# Patient Record
Sex: FEMALE | Race: WHITE | HISPANIC OR LATINO | URBAN - METROPOLITAN AREA
[De-identification: names, ages, dates, MRNs, and addresses within clinical notes are randomized per-mention and may not be internally consistent; named-entity substitution may affect disease eponyms.]

---

## 2018-08-14 ENCOUNTER — EMERGENCY (EMERGENCY)
Facility: HOSPITAL | Age: 23
LOS: 1 days | Discharge: HOME | End: 2018-08-14
Attending: EMERGENCY MEDICINE | Admitting: EMERGENCY MEDICINE
Payer: COMMERCIAL

## 2018-08-14 VITALS
WEIGHT: 123.02 LBS | HEART RATE: 100 BPM | DIASTOLIC BLOOD PRESSURE: 82 MMHG | OXYGEN SATURATION: 100 % | SYSTOLIC BLOOD PRESSURE: 132 MMHG | TEMPERATURE: 99 F | RESPIRATION RATE: 18 BRPM

## 2018-08-14 VITALS
HEART RATE: 90 BPM | SYSTOLIC BLOOD PRESSURE: 120 MMHG | OXYGEN SATURATION: 100 % | RESPIRATION RATE: 18 BRPM | TEMPERATURE: 99 F | DIASTOLIC BLOOD PRESSURE: 77 MMHG

## 2018-08-14 LAB
ALBUMIN SERPL ELPH-MCNC: 4.8 G/DL — SIGNIFICANT CHANGE UP (ref 3.3–5)
ALP SERPL-CCNC: 53 U/L — SIGNIFICANT CHANGE UP (ref 40–120)
ALT FLD-CCNC: 7 U/L — LOW (ref 10–45)
ANION GAP SERPL CALC-SCNC: 14 MMOL/L — SIGNIFICANT CHANGE UP (ref 5–17)
APPEARANCE UR: ABNORMAL
AST SERPL-CCNC: 17 U/L — SIGNIFICANT CHANGE UP (ref 10–40)
BACTERIA # UR AUTO: PRESENT /HPF
BASOPHILS NFR BLD AUTO: 0.1 % — SIGNIFICANT CHANGE UP (ref 0–2)
BILIRUB SERPL-MCNC: 0.5 MG/DL — SIGNIFICANT CHANGE UP (ref 0.2–1.2)
BILIRUB UR-MCNC: NEGATIVE — SIGNIFICANT CHANGE UP
BUN SERPL-MCNC: 8 MG/DL — SIGNIFICANT CHANGE UP (ref 7–23)
CALCIUM SERPL-MCNC: 9.5 MG/DL — SIGNIFICANT CHANGE UP (ref 8.4–10.5)
CHLORIDE SERPL-SCNC: 98 MMOL/L — SIGNIFICANT CHANGE UP (ref 96–108)
CO2 SERPL-SCNC: 23 MMOL/L — SIGNIFICANT CHANGE UP (ref 22–31)
COLOR SPEC: ABNORMAL
CREAT SERPL-MCNC: 0.72 MG/DL — SIGNIFICANT CHANGE UP (ref 0.5–1.3)
DIFF PNL FLD: ABNORMAL
EOSINOPHIL NFR BLD AUTO: 0.7 % — SIGNIFICANT CHANGE UP (ref 0–6)
EPI CELLS # UR: SIGNIFICANT CHANGE UP /HPF (ref 0–5)
GLUCOSE SERPL-MCNC: 112 MG/DL — HIGH (ref 70–99)
GLUCOSE UR QL: NEGATIVE — SIGNIFICANT CHANGE UP
HCT VFR BLD CALC: 38.3 % — SIGNIFICANT CHANGE UP (ref 34.5–45)
HGB BLD-MCNC: 12.9 G/DL — SIGNIFICANT CHANGE UP (ref 11.5–15.5)
KETONES UR-MCNC: NEGATIVE — SIGNIFICANT CHANGE UP
LEUKOCYTE ESTERASE UR-ACNC: ABNORMAL
LYMPHOCYTES # BLD AUTO: 17.9 % — SIGNIFICANT CHANGE UP (ref 13–44)
MCHC RBC-ENTMCNC: 31.5 PG — SIGNIFICANT CHANGE UP (ref 27–34)
MCHC RBC-ENTMCNC: 33.7 G/DL — SIGNIFICANT CHANGE UP (ref 32–36)
MCV RBC AUTO: 93.4 FL — SIGNIFICANT CHANGE UP (ref 80–100)
MONOCYTES NFR BLD AUTO: 9.7 % — SIGNIFICANT CHANGE UP (ref 2–14)
NEUTROPHILS NFR BLD AUTO: 71.6 % — SIGNIFICANT CHANGE UP (ref 43–77)
NITRITE UR-MCNC: NEGATIVE — SIGNIFICANT CHANGE UP
PH UR: 8 — SIGNIFICANT CHANGE UP (ref 5–8)
PLATELET # BLD AUTO: 257 K/UL — SIGNIFICANT CHANGE UP (ref 150–400)
POTASSIUM SERPL-MCNC: 3.6 MMOL/L — SIGNIFICANT CHANGE UP (ref 3.5–5.3)
POTASSIUM SERPL-SCNC: 3.6 MMOL/L — SIGNIFICANT CHANGE UP (ref 3.5–5.3)
PROT SERPL-MCNC: 8.2 G/DL — SIGNIFICANT CHANGE UP (ref 6–8.3)
PROT UR-MCNC: 30 MG/DL
RBC # BLD: 4.1 M/UL — SIGNIFICANT CHANGE UP (ref 3.8–5.2)
RBC # FLD: 14 % — SIGNIFICANT CHANGE UP (ref 10.3–16.9)
RBC CASTS # UR COMP ASSIST: ABNORMAL /HPF
SODIUM SERPL-SCNC: 135 MMOL/L — SIGNIFICANT CHANGE UP (ref 135–145)
SP GR SPEC: 1.01 — SIGNIFICANT CHANGE UP (ref 1–1.03)
UROBILINOGEN FLD QL: 0.2 E.U./DL — SIGNIFICANT CHANGE UP
WBC # BLD: 8.2 K/UL — SIGNIFICANT CHANGE UP (ref 3.8–10.5)
WBC # FLD AUTO: 8.2 K/UL — SIGNIFICANT CHANGE UP (ref 3.8–10.5)
WBC UR QL: < 5 /HPF — SIGNIFICANT CHANGE UP

## 2018-08-14 PROCEDURE — 85025 COMPLETE CBC W/AUTO DIFF WBC: CPT

## 2018-08-14 PROCEDURE — 99284 EMERGENCY DEPT VISIT MOD MDM: CPT

## 2018-08-14 PROCEDURE — 99283 EMERGENCY DEPT VISIT LOW MDM: CPT

## 2018-08-14 PROCEDURE — 81001 URINALYSIS AUTO W/SCOPE: CPT

## 2018-08-14 PROCEDURE — 87184 SC STD DISK METHOD PER PLATE: CPT

## 2018-08-14 PROCEDURE — 80053 COMPREHEN METABOLIC PANEL: CPT

## 2018-08-14 PROCEDURE — 87086 URINE CULTURE/COLONY COUNT: CPT

## 2018-08-14 PROCEDURE — 36415 COLL VENOUS BLD VENIPUNCTURE: CPT

## 2018-08-14 NOTE — ED PROVIDER NOTE - MEDICAL DECISION MAKING DETAILS
21 yo F with no pmh  c/o painless intermittent hematuria x 2 years. No dysuria, back pain or abd pain. VSS. Appears well. Abd soft, nt, nd, no CVAT. 23 yo F with no pmh  c/o painless intermittent hematuria x 2 years. No dysuria, back pain or abd pain. VSS. Appears well. Abd soft, nt, nd, no CVAT. Will check kidney function, UA, refer to urology

## 2018-08-14 NOTE — ED PROVIDER NOTE - OBJECTIVE STATEMENT
21 yo F with no pmh  c/o intermittent hematuria x 2 years. Pt states she will have a few days of hematuria and then it will resolved. Denies dysuria, hematuria, frequency, abd pain, back pain, n/v. Pt states she has never got it checked out and her mom told her to come to get it evaluated.

## 2018-08-14 NOTE — ED PROVIDER NOTE - PHYSICAL EXAMINATION
CONSTITUTIONAL: Well-appearing; well-nourished; in no apparent distress.   HEAD: Normocephalic; atraumatic.   EYES: PERRL; EOM intact; conjunctiva and sclera clear  ENT: normal nose; no rhinorrhea; normal pharynx with no erythema or lesions.   NECK: Supple; non-tender; no LAD  CARDIOVASCULAR: Normal S1, S2; no murmurs, rubs, or gallops. Regular rate and rhythm.   RESPIRATORY: Breathing easily; breath sounds clear and equal bilaterally; no wheezes, rhonchi, or rales.  GI: Soft; non-distended; non-tender; no palpable organomegaly. No CVAT  MSK: FROM at all extremities, normal tone   EXT: No cyanosis or edema; N/V intact  SKIN: Normal for age and race; warm; dry; good turgor; no apparent lesions or rash.   NEURO: A & O x 3; face symmetric; grossly unremarkable.   PSYCHOLOGICAL: The patient’s mood and manner are appropriate.

## 2018-08-14 NOTE — ED ADULT NURSE NOTE - OBJECTIVE STATEMENT
pt received in [place] [facility] o x 3 pt c/o hematuria on and off for 2 years , denies nay urinary symptoms , no dysuria , no urinary frequency

## 2018-08-14 NOTE — ED ADULT NURSE NOTE - NSIMPLEMENTINTERV_GEN_ALL_ED
Implemented All Universal Safety Interventions:  [place] to call system. Call bell, personal items and telephone within reach. Instruct patient to call for assistance. Room bathroom lighting operational. Non-slip footwear when patient is off stretcher. Physically safe environment: no spills, clutter or unnecessary equipment. Stretcher in lowest position, wheels locked, appropriate side rails in place.

## 2018-08-14 NOTE — ED ADULT TRIAGE NOTE - OTHER COMPLAINTS
Hx of colitis. Denies pain, fevers, chills. No CVA tenderness. Patient reports bright red blood intermittent x2 years. Came today because she told her mom and mom was concerned.

## 2018-08-14 NOTE — ED PROVIDER NOTE - ATTENDING CONTRIBUTION TO CARE
21 yo F hx of colitis presenting with 2 years of intermittent painless hematuria.  No dysuria, frequency or burning or flank pain.  Not currently having menses.  Came today because family member encouraged to finally have it evaluated.  Nl exam, no CVAT, belly soft.  Labs and urine checked and hematuria confirmed.  Will need outpt urology fu.  Pt ok with plan.

## 2018-08-16 PROBLEM — Z00.00 ENCOUNTER FOR PREVENTIVE HEALTH EXAMINATION: Status: ACTIVE | Noted: 2018-08-16

## 2018-08-16 LAB
-  CEFTRIAXONE: SIGNIFICANT CHANGE UP
-  CLINDAMYCIN: SIGNIFICANT CHANGE UP
-  ERYTHROMYCIN: SIGNIFICANT CHANGE UP
-  PENICILLIN: SIGNIFICANT CHANGE UP
-  VANCOMYCIN: SIGNIFICANT CHANGE UP
CULTURE RESULTS: SIGNIFICANT CHANGE UP
METHOD TYPE: SIGNIFICANT CHANGE UP
METHOD TYPE: SIGNIFICANT CHANGE UP
ORGANISM # SPEC MICROSCOPIC CNT: SIGNIFICANT CHANGE UP
SPECIMEN SOURCE: SIGNIFICANT CHANGE UP

## 2018-08-16 RX ORDER — CEPHALEXIN 500 MG
1 CAPSULE ORAL
Qty: 14 | Refills: 0 | OUTPATIENT
Start: 2018-08-16 | End: 2018-08-22

## 2018-08-18 DIAGNOSIS — R31.9 HEMATURIA, UNSPECIFIED: ICD-10-CM

## 2018-08-22 ENCOUNTER — TRANSCRIPTION ENCOUNTER (OUTPATIENT)
Age: 23
End: 2018-08-22

## 2018-08-22 ENCOUNTER — APPOINTMENT (OUTPATIENT)
Dept: UROLOGY | Facility: CLINIC | Age: 23
End: 2018-08-22
Payer: COMMERCIAL

## 2018-08-22 VITALS — HEART RATE: 89 BPM | DIASTOLIC BLOOD PRESSURE: 77 MMHG | TEMPERATURE: 98.7 F | SYSTOLIC BLOOD PRESSURE: 129 MMHG

## 2018-08-22 LAB
APPEARANCE: ABNORMAL
BACTERIA: PRESENT /HPF
BILIRUBIN URINE: ABNORMAL
BLOOD URINE: ABNORMAL
COLOR: ABNORMAL
GLUCOSE QUALITATIVE U: NEGATIVE
KETONES URINE: 15 MG/DL
LEUKOCYTE ESTERASE URINE: ABNORMAL
MICROSCOPIC-UA: NORMAL
NITRITE URINE: POSITIVE
PH URINE: 7
PROTEIN URINE: 100 MG/DL
RED BLOOD CELLS URINE: ABNORMAL /HPF
SPECIFIC GRAVITY URINE: 1.02
SQUAMOUS EPITHELIAL CELLS: ABNORMAL /HPF
UROBILINOGEN URINE: 1 E.U./DL
WHITE BLOOD CELLS URINE: ABNORMAL /HPF

## 2018-08-22 PROCEDURE — 99204 OFFICE O/P NEW MOD 45 MIN: CPT

## 2018-08-23 ENCOUNTER — FORM ENCOUNTER (OUTPATIENT)
Age: 23
End: 2018-08-23

## 2018-08-24 ENCOUNTER — OUTPATIENT (OUTPATIENT)
Dept: OUTPATIENT SERVICES | Facility: HOSPITAL | Age: 23
LOS: 1 days | End: 2018-08-24
Payer: COMMERCIAL

## 2018-08-24 ENCOUNTER — APPOINTMENT (OUTPATIENT)
Dept: CT IMAGING | Facility: HOSPITAL | Age: 23
End: 2018-08-24
Payer: COMMERCIAL

## 2018-08-24 LAB
BACTERIA UR CULT: NORMAL
CORE LAB NON GYN CYTOLOGY TO LENOX HILL: NORMAL

## 2018-08-24 PROCEDURE — 74178 CT ABD&PLV WO CNTR FLWD CNTR: CPT | Mod: 26

## 2018-08-24 PROCEDURE — 74178 CT ABD&PLV WO CNTR FLWD CNTR: CPT

## 2018-08-29 ENCOUNTER — APPOINTMENT (OUTPATIENT)
Dept: UROLOGY | Facility: CLINIC | Age: 23
End: 2018-08-29
Payer: COMMERCIAL

## 2018-08-29 VITALS — HEART RATE: 82 BPM | SYSTOLIC BLOOD PRESSURE: 139 MMHG | TEMPERATURE: 99.5 F | DIASTOLIC BLOOD PRESSURE: 76 MMHG

## 2018-08-29 PROCEDURE — 99213 OFFICE O/P EST LOW 20 MIN: CPT

## 2018-08-31 LAB — BACTERIA UR CULT: NORMAL

## 2018-09-04 ENCOUNTER — APPOINTMENT (OUTPATIENT)
Dept: NEPHROLOGY | Facility: CLINIC | Age: 23
End: 2018-09-04
Payer: COMMERCIAL

## 2018-09-04 VITALS — HEART RATE: 90 BPM | RESPIRATION RATE: 14 BRPM | DIASTOLIC BLOOD PRESSURE: 60 MMHG | SYSTOLIC BLOOD PRESSURE: 110 MMHG

## 2018-09-04 PROCEDURE — 99406 BEHAV CHNG SMOKING 3-10 MIN: CPT

## 2018-09-04 PROCEDURE — 99244 OFF/OP CNSLTJ NEW/EST MOD 40: CPT

## 2018-09-04 RX ORDER — CEPHALEXIN 500 MG/1
500 CAPSULE ORAL
Qty: 14 | Refills: 0 | Status: DISCONTINUED | COMMUNITY
Start: 2018-08-16 | End: 2018-09-04

## 2018-09-26 ENCOUNTER — APPOINTMENT (OUTPATIENT)
Dept: UROLOGY | Facility: CLINIC | Age: 23
End: 2018-09-26
Payer: COMMERCIAL

## 2018-09-26 VITALS — HEART RATE: 73 BPM | DIASTOLIC BLOOD PRESSURE: 75 MMHG | SYSTOLIC BLOOD PRESSURE: 120 MMHG | TEMPERATURE: 98.6 F

## 2018-09-26 PROCEDURE — 52000 CYSTOURETHROSCOPY: CPT

## 2018-10-09 ENCOUNTER — APPOINTMENT (OUTPATIENT)
Dept: VASCULAR SURGERY | Facility: CLINIC | Age: 23
End: 2018-10-09
Payer: COMMERCIAL

## 2018-10-09 PROCEDURE — 99204 OFFICE O/P NEW MOD 45 MIN: CPT

## 2018-10-30 ENCOUNTER — TRANSCRIPTION ENCOUNTER (OUTPATIENT)
Age: 23
End: 2018-10-30

## 2018-12-10 ENCOUNTER — APPOINTMENT (OUTPATIENT)
Dept: UROLOGY | Facility: CLINIC | Age: 23
End: 2018-12-10
Payer: COMMERCIAL

## 2018-12-10 VITALS — SYSTOLIC BLOOD PRESSURE: 155 MMHG | DIASTOLIC BLOOD PRESSURE: 80 MMHG | TEMPERATURE: 98.9 F | HEART RATE: 105 BPM

## 2018-12-10 PROCEDURE — 99213 OFFICE O/P EST LOW 20 MIN: CPT

## 2018-12-13 LAB — BACTERIA UR CULT: ABNORMAL

## 2019-03-24 ENCOUNTER — TRANSCRIPTION ENCOUNTER (OUTPATIENT)
Age: 24
End: 2019-03-24

## 2019-04-10 ENCOUNTER — APPOINTMENT (OUTPATIENT)
Dept: NEPHROLOGY | Facility: CLINIC | Age: 24
End: 2019-04-10

## 2019-07-08 ENCOUNTER — APPOINTMENT (OUTPATIENT)
Dept: SURGERY | Facility: CLINIC | Age: 24
End: 2019-07-08
Payer: COMMERCIAL

## 2019-07-08 VITALS
OXYGEN SATURATION: 96 % | BODY MASS INDEX: 20.17 KG/M2 | HEIGHT: 66 IN | HEART RATE: 70 BPM | SYSTOLIC BLOOD PRESSURE: 109 MMHG | WEIGHT: 125.5 LBS | DIASTOLIC BLOOD PRESSURE: 71 MMHG | TEMPERATURE: 97.6 F

## 2019-07-08 DIAGNOSIS — Z87.440 PERSONAL HISTORY OF URINARY (TRACT) INFECTIONS: ICD-10-CM

## 2019-07-08 DIAGNOSIS — Z84.1 FAMILY HISTORY OF DISORDERS OF KIDNEY AND URETER: ICD-10-CM

## 2019-07-08 DIAGNOSIS — L02.411 CUTANEOUS ABSCESS OF RIGHT AXILLA: ICD-10-CM

## 2019-07-08 DIAGNOSIS — N30.01 ACUTE CYSTITIS WITH HEMATURIA: ICD-10-CM

## 2019-07-08 DIAGNOSIS — L02.412 CUTANEOUS ABSCESS OF RIGHT AXILLA: ICD-10-CM

## 2019-07-08 DIAGNOSIS — Z87.19 PERSONAL HISTORY OF OTHER DISEASES OF THE DIGESTIVE SYSTEM: ICD-10-CM

## 2019-07-08 DIAGNOSIS — F17.200 NICOTINE DEPENDENCE, UNSPECIFIED, UNCOMPLICATED: ICD-10-CM

## 2019-07-08 PROCEDURE — 99204 OFFICE O/P NEW MOD 45 MIN: CPT

## 2019-07-08 RX ORDER — SULFAMETHOXAZOLE AND TRIMETHOPRIM 800; 160 MG/1; MG/1
800-160 TABLET ORAL TWICE DAILY
Qty: 14 | Refills: 0 | Status: DISCONTINUED | COMMUNITY
Start: 2019-01-04 | End: 2019-07-08

## 2019-07-08 RX ORDER — PHENAZOPYRIDINE HYDROCHLORIDE 200 MG/1
200 TABLET ORAL 3 TIMES DAILY
Qty: 12 | Refills: 0 | Status: DISCONTINUED | COMMUNITY
Start: 2018-12-10 | End: 2019-07-08

## 2019-08-17 PROBLEM — F17.200 TOBACCO DEPENDENCY: Status: ACTIVE | Noted: 2018-09-10

## 2019-08-17 PROBLEM — N30.01 ACUTE CYSTITIS WITH HEMATURIA: Status: ACTIVE | Noted: 2018-08-22

## 2019-08-17 NOTE — REVIEW OF SYSTEMS
[Heart Rate Is Slow] : the heart rate was not slow [Heart Rate Is Fast] : fast heart rate [Chest Pain] : chest pain [Palpitations] : no palpitations [Leg Claudication] : no intermittent leg claudication [Lower Ext Edema] : no lower extremity edema [Shortness Of Breath] : shortness of breath [Wheezing] : no wheezing [Cough] : no cough [SOB on Exertion] : no shortness of breath during exertion [Orthopnea] : no orthopnea [PND] : no PND [Abdominal Pain] : no abdominal pain [Vomiting] : no vomiting [Constipation] : constipation [Diarrhea] : no diarrhea [Heartburn] : no heartburn [Melena] : no melena [Sleep Disturbances] : no sleep disturbances [Suicidal] : not suicidal [Anxiety] : anxiety [Depression] : no depression [Change In Personality] : no personality change [Emotional Problems] : no emotional problems [Negative] : Heme/Lymph

## 2019-08-17 NOTE — HISTORY OF PRESENT ILLNESS
[de-identified] : Ms. Dick presented for evaluation and management of bilateral axillary abscesses.  The abscesses first were identified in January 2019.  She complained of itching of the abscesses.  She uses warm compresses to help with the abscesses.

## 2019-08-17 NOTE — ASSESSMENT
[FreeTextEntry1] : Ms. Dick is a 23-year-old woman with likely bilateral hidradenitis suppurativa.  She was referred to plastic surgery for evaluation and management.  She will follow up with me as needed.

## 2019-08-17 NOTE — PHYSICAL EXAM
[Calm] : calm [de-identified] : NCAT, no scleral icterus [de-identified] : NAD, comfortable [de-identified] : Supple, no JVD or cervical lymphadenopathy [de-identified] : S1S2 normal, RRR [de-identified] : CTAB [de-identified] : +BS soft NT ND.  No hepatosplenomegaly. [de-identified] : No clubbing, cyanosis, or edema. [de-identified] : Warm, dry.  Bilateral axillae have tracts and sinuses without any fluctuance to suggest active abscess, likely hidradenitis suppurativa. [de-identified] : A&Ox3

## 2020-12-21 PROBLEM — Z87.440 HISTORY OF URINARY TRACT INFECTION: Status: RESOLVED | Noted: 2019-01-04 | Resolved: 2020-12-21

## 2023-03-08 ENCOUNTER — EMERGENCY (EMERGENCY)
Facility: HOSPITAL | Age: 28
LOS: 1 days | Discharge: HOME | End: 2023-03-08
Admitting: EMERGENCY MEDICINE
Payer: COMMERCIAL

## 2023-03-08 VITALS
TEMPERATURE: 98 F | RESPIRATION RATE: 18 BRPM | HEIGHT: 66 IN | OXYGEN SATURATION: 98 % | WEIGHT: 121.03 LBS | HEART RATE: 91 BPM | SYSTOLIC BLOOD PRESSURE: 109 MMHG | DIASTOLIC BLOOD PRESSURE: 61 MMHG

## 2023-03-08 PROCEDURE — 99284 EMERGENCY DEPT VISIT MOD MDM: CPT

## 2023-03-08 PROCEDURE — 99282 EMERGENCY DEPT VISIT SF MDM: CPT

## 2023-03-08 RX ORDER — CLINDAMYCIN PHOSPHATE GEL USP, 1% 10 MG/G
1 GEL TOPICAL
Qty: 1 | Refills: 0
Start: 2023-03-08 | End: 2023-04-06

## 2023-03-08 NOTE — ED PROVIDER NOTE - CLINICAL SUMMARY MEDICAL DECISION MAKING FREE TEXT BOX
27-year-old female with past medical history of hidradenitis complains of swelling to right underarm.  Patient states it comes and goes she has had it for about 6 months, this morning it was very swollen and painful but then opened up on its own and started to drain.  Patient's mother wanted her to come here so she can get follow-up with this hospital.  Patient has never seen a specialist and has been in and out of the emergency room.  States sometimes she gets them on her breast as well.  Denies fever, chills. scars to R breast, +induration to R axilla, no fluctuance, no erythema 27-year-old female with past medical history of hidradenitis complains of swelling to right underarm.  Patient states it comes and goes she has had it for about 6 months, this morning it was very swollen and painful but then opened up on its own and started to drain.  Patient's mother wanted her to come here so she can get follow-up with this hospital.  Patient has never seen a specialist and has been in and out of the emergency room.  States sometimes she gets them on her breast as well.  Denies fever, chills. scars to R breast, +induration to R axilla, no fluctuance, no erythema. spoke with care coordinator regarding derm appt, pt given info for f/u

## 2023-03-08 NOTE — ED PROVIDER NOTE - OBJECTIVE STATEMENT
27-year-old female with past medical history of hidradenitis complains of swelling to right underarm.  Patient states it comes and goes she has had it for about 6 months, this morning it was very swollen and painful but then opened up on its own and started to drain.  Patient's mother wanted her to come here so she can get follow-up with this hospital.  Patient has never seen a specialist and has been in and out of the emergency room.  States sometimes she gets them on her breast as well.  Denies fever, chills

## 2023-03-08 NOTE — ED PROVIDER NOTE - NSFOLLOWUPINSTRUCTIONS_ED_ALL_ED_FT
Hidradenitis Suppurativa    WHAT YOU NEED TO KNOW:    Hidradenitis suppurativa (HS) is a chronic (long-term) skin disease that causes your sweat glands or hair follicles to get clogged and inflamed. HS causes red bumps that look like pimples or small boils to develop on your skin. The cause of HS is unknown. It may run in families. Being overweight and smoking worsens signs and symptoms of HS.     DISCHARGE INSTRUCTIONS:    Contact your healthcare provider if:   •Your symptoms get worse, even with treatment.       •You have questions or concerns about your condition or care.      Medicines: You may need any of the following:   •Antibiotics are used to treat or prevent a bacterial infection. Antibiotics may be used long-term. Antibiotics may be given as a cream or pill.       •NSAIDs, such as ibuprofen, help decrease swelling, pain, and fever. This medicine is available with or without a doctor's order. NSAIDs can cause stomach bleeding or kidney problems in certain people. If you take blood thinner medicine, always ask your healthcare provider if NSAIDs are safe for you. Always read the medicine label and follow directions.      •Acetaminophen decreases pain and fever. It is available without a doctor's order. Ask how much to take and how often to take it. Follow directions. Acetaminophen can cause liver damage if not taken correctly.      •Other medicines may be used to treat HS. These may include hormones, acne medicines, steroids, biologic therapy, and medicines that slow your immune system.       •Take your medicine as directed. Contact your healthcare provider if you think your medicine is not helping or if you have side effects. Tell your provider if you are allergic to any medicine. Keep a list of the medicines, vitamins, and herbs you take. Include the amounts, and when and why you take them. Bring the list or the pill bottles to follow-up visits. Carry your medicine list with you in case of an emergency.      Manage HS symptoms and decrease flare-ups:   •Apply warm, moist compresses. This may help to decrease pain. Keep the compress on your skin for 10 minutes. Sitz baths may be recommended if your genital or anal area is affected by HS. To do a sitz bath, fill a bathtub with 4 to 6 inches of warm water. You may also use a sitz bath pan that fits inside a toilet bowl. Sit in the sitz bath for 15 minutes. Do this 3 times a day, and after each bowel movement. The warm water can help decrease pain and swelling.       •Wash your skin gently. Use cleansers recommended by your healthcare provider. Antibacterial soap may be helpful.      •Lose weight if you are overweight. Weight loss may help to improve signs and symptoms of HS.       •Do not smoke. Smoking can make it more difficult to treat HS and worsen symptoms. Ask your healthcare provider for information if you currently smoke and need help to quit. E-cigarettes or smokeless tobacco still contain nicotine. Talk to your healthcare provider before you use these products.       •Do not wear tight clothing. Tight clothing rubs against your skin and causes irritation that can worsen HS.      •Do not shave or use deodorant in areas of skin affected by HS. Ask your healthcare provider about safe deodorant or hair removal options.       •Keep your skin cool. Overheating and sweating can cause an HS flare-up.       •Ask your healthcare provider if you should make any changes to the foods you eat. Your healthcare provider may recommend that you avoid dairy foods. A dairy-free diet may help decrease your symptoms. Dairy foods include milk, cheese, yogurt, and ice cream.       •Tell your healthcare provider if HS is causing you to feel depressed. Your healthcare provider may recommend counseling to help you cope with HS.       Follow up with your doctor as directed: Write down your questions so you remember to ask them during your visits.

## 2023-03-08 NOTE — ED PROVIDER NOTE - PHYSICAL EXAMINATION
CONSTITUTIONAL: Well-appearing;  in no apparent distress.   HEAD: Normocephalic; atraumatic.   EYES: PERRL; EOM intact; conjunctiva and sclera clear  ENT: normal nose; no rhinorrhea; normal pharynx with no erythema or lesions.   MSK: FROM at all extremities, normal tone   EXT: No cyanosis or edema; N/V intact  SKIN: scars to R breast, +induration to R axilla, no fluctuance, no erythema

## 2023-03-08 NOTE — ED ADULT TRIAGE NOTE - CHIEF COMPLAINT QUOTE
Patient states has Hidradenitis suppurativa, c/o of abscess with pus and pain and swelling on right armpit, under the right breast, and right big toe.  No chills/fever.

## 2023-03-08 NOTE — ED PROVIDER NOTE - CARE PROVIDER_API CALL
Betzy Diego)  Dermatology  [address] [place] [address], [place] [address]  Phone: [phone]  Fax: [phone]  Follow Up Time:

## 2023-03-08 NOTE — ED PROVIDER NOTE - PATIENT PORTAL LINK FT
You can access the FollowMyHealth Patient Portal offered by [facility] by registering at the following website: [email or web address]. By joining [email or web address] FollowMyHealth portal, you will also be able to view your health information using other applications (apps) compatible with our system.

## 2023-03-08 NOTE — ED ADULT NURSE NOTE - OBJECTIVE STATEMENT
27y female w PMH of hidradenitis c/o swelling to R underarm. states she has had this for months and this morning drained it on her own. states she occasionally gets same abscess on breasts. denies f/c. not on any abx currently. No acute distress noted at this time. +redness and swelling to site.

## 2023-03-10 DIAGNOSIS — M79.89 OTHER SPECIFIED SOFT TISSUE DISORDERS: ICD-10-CM

## 2023-03-10 DIAGNOSIS — R23.4 CHANGES IN SKIN TEXTURE: ICD-10-CM

## 2023-03-10 DIAGNOSIS — L73.2 HIDRADENITIS SUPPURATIVA: ICD-10-CM

## 2023-04-28 ENCOUNTER — APPOINTMENT (OUTPATIENT)
Dept: DERMATOLOGY | Facility: CLINIC | Age: 28
End: 2023-04-28
Payer: COMMERCIAL

## 2023-04-28 PROCEDURE — 11900 INJECT SKIN LESIONS </W 7: CPT

## 2023-04-28 PROCEDURE — 99204 OFFICE O/P NEW MOD 45 MIN: CPT | Mod: 25

## 2023-05-01 ENCOUNTER — NON-APPOINTMENT (OUTPATIENT)
Age: 28
End: 2023-05-01

## 2023-05-01 NOTE — ASSESSMENT
[FreeTextEntry1] : # Hidradenitis suppurativa - chronic condition not at treatment goal\par - Discussed condition, etiology, and exacerbating factors including cigarette/tobacco use\par - Pt is actively working on smoking cessation with PMD\par - We discussed treatment options including topical antibiotics, PO doxycycline, laser hair reduction, and systemic agents such as Humira\par - Systemic therapy with Humira also reviewed including R/B/E; she declines for now\par - As there are three focal sinus tracts, feel that surgical excision is a good option; referred to plastic or general surgery\par - For symptom control, will perform ILK\par \par Intralesional injection of Kenalog, 5 mg/ml ([id number], exp 4/24)\par A total of 0.5 ml was injected over 3 injection sites\par \par The risks/benefits/alternatives of intralesional steroid injections were reviewed with the patient. Intralesional injections were performed in the affected area. The patient tolerated the procedure well.\par \par FU PRN for ILK

## 2023-05-01 NOTE — HISTORY OF PRESENT ILLNESS
[FreeTextEntry1] : JAMEY - NPA [de-identified] : # Hidradenitis suppurativa\par Ongoing x years\par Bilateral axilla and sometimes inframammary\par Has tender lesions on R > L axilla now\par Has used short courses of doxy in the past as well as topicals\par Currently smoking 1 pack every 4 days\par \par Here today with mom.

## 2023-05-01 NOTE — PHYSICAL EXAM
[Alert] : alert [FreeTextEntry3] : Focused exam performed. Findings notable for:\par \par Body tender sinus tract with drainage on R axilla x2 and L axilla [Oriented x 3] : ~L oriented x 3

## 2023-05-09 ENCOUNTER — APPOINTMENT (OUTPATIENT)
Dept: PLASTIC SURGERY | Facility: CLINIC | Age: 28
End: 2023-05-09
Payer: COMMERCIAL

## 2023-05-09 VITALS — WEIGHT: 120 LBS | BODY MASS INDEX: 19.29 KG/M2 | TEMPERATURE: 97.9 F | HEIGHT: 66 IN

## 2023-05-09 DIAGNOSIS — N60.02 SOLITARY CYST OF LEFT BREAST: ICD-10-CM

## 2023-05-09 PROCEDURE — 99203 OFFICE O/P NEW LOW 30 MIN: CPT

## 2023-05-09 NOTE — ASSESSMENT
[FreeTextEntry1] : Pt was seen and examined together by EVE Rosario and Dr. Volodymyr Hoffman. Assessment and plan formulated and discussed at time of visit.\par

## 2023-05-09 NOTE — HISTORY OF PRESENT ILLNESS
[FreeTextEntry1] : Problem - Hidradenitis suppurativa under bilateral axilla. Noted 6/7 years ago. \par Patient has been seen by Dermatology. Dr Nayla Chacon\par Previous treatments - Steroid injection 5/5/23 and Topicial 1% gel, and antibiotic soap\par No itching, bleeding, or drainage.\par No Imaging/Biopsy.\par Slowly growing, no change in color.\par No Infection or inflammation.\par Discomfort.\par No personal hx of skin cancer, masses.\par No family hx of skin cancer.\par \par

## 2023-05-09 NOTE — REASON FOR VISIT
Per Dr. Gama, the patient should not be taking 2 pills daily for her migraines and this is likely causing rebound. LMOVM to explain why we cannot PA for 30 days. Pt to call back for questions.    [Consultation] : a consultation visit [Family Member] : family member [FreeTextEntry1] : Dr Nayla Chacon

## 2023-05-13 ENCOUNTER — EMERGENCY (EMERGENCY)
Facility: HOSPITAL | Age: 28
LOS: 1 days | Discharge: LEFT BEFORE DISCHARGE | End: 2023-05-13
Admitting: EMERGENCY MEDICINE
Payer: COMMERCIAL

## 2023-05-13 VITALS
TEMPERATURE: 98 F | RESPIRATION RATE: 16 BRPM | DIASTOLIC BLOOD PRESSURE: 61 MMHG | SYSTOLIC BLOOD PRESSURE: 105 MMHG | OXYGEN SATURATION: 99 % | HEART RATE: 78 BPM

## 2023-05-13 VITALS
HEIGHT: 66 IN | SYSTOLIC BLOOD PRESSURE: 116 MMHG | WEIGHT: 121.25 LBS | OXYGEN SATURATION: 100 % | DIASTOLIC BLOOD PRESSURE: 71 MMHG | RESPIRATION RATE: 18 BRPM | HEART RATE: 98 BPM | TEMPERATURE: 98 F

## 2023-05-13 DIAGNOSIS — R82.71 BACTERIURIA: ICD-10-CM

## 2023-05-13 DIAGNOSIS — R31.9 HEMATURIA, UNSPECIFIED: ICD-10-CM

## 2023-05-13 DIAGNOSIS — R80.9 PROTEINURIA, UNSPECIFIED: ICD-10-CM

## 2023-05-13 DIAGNOSIS — N61.1 ABSCESS OF THE BREAST AND NIPPLE: ICD-10-CM

## 2023-05-13 DIAGNOSIS — Z86.79 PERSONAL HISTORY OF OTHER DISEASES OF THE CIRCULATORY SYSTEM: ICD-10-CM

## 2023-05-13 DIAGNOSIS — Z88.0 ALLERGY STATUS TO PENICILLIN: ICD-10-CM

## 2023-05-13 DIAGNOSIS — R82.4 ACETONURIA: ICD-10-CM

## 2023-05-13 DIAGNOSIS — Z53.29 PROCEDURE AND TREATMENT NOT CARRIED OUT BECAUSE OF PATIENT'S DECISION FOR OTHER REASONS: ICD-10-CM

## 2023-05-13 DIAGNOSIS — Z88.1 ALLERGY STATUS TO OTHER ANTIBIOTIC AGENTS STATUS: ICD-10-CM

## 2023-05-13 DIAGNOSIS — Z87.448 PERSONAL HISTORY OF OTHER DISEASES OF URINARY SYSTEM: ICD-10-CM

## 2023-05-13 DIAGNOSIS — Z86.018 PERSONAL HISTORY OF OTHER BENIGN NEOPLASM: ICD-10-CM

## 2023-05-13 DIAGNOSIS — R31.0 GROSS HEMATURIA: ICD-10-CM

## 2023-05-13 DIAGNOSIS — Z87.440 PERSONAL HISTORY OF URINARY (TRACT) INFECTIONS: ICD-10-CM

## 2023-05-13 DIAGNOSIS — R82.998 OTHER ABNORMAL FINDINGS IN URINE: ICD-10-CM

## 2023-05-13 DIAGNOSIS — L73.2 HIDRADENITIS SUPPURATIVA: ICD-10-CM

## 2023-05-13 DIAGNOSIS — Z86.19 PERSONAL HISTORY OF OTHER INFECTIOUS AND PARASITIC DISEASES: ICD-10-CM

## 2023-05-13 LAB
ANION GAP SERPL CALC-SCNC: 9 MMOL/L — SIGNIFICANT CHANGE UP (ref 5–17)
ANISOCYTOSIS BLD QL: SLIGHT — SIGNIFICANT CHANGE UP
APPEARANCE UR: ABNORMAL
APTT BLD: 27.2 SEC — LOW (ref 27.5–35.5)
BACTERIA # UR AUTO: PRESENT /HPF
BASOPHILS # BLD AUTO: 0 K/UL — SIGNIFICANT CHANGE UP (ref 0–0.2)
BASOPHILS NFR BLD AUTO: 0 % — SIGNIFICANT CHANGE UP (ref 0–2)
BILIRUB UR-MCNC: NEGATIVE — SIGNIFICANT CHANGE UP
BUN SERPL-MCNC: 8 MG/DL — SIGNIFICANT CHANGE UP (ref 7–23)
CALCIUM SERPL-MCNC: 9.1 MG/DL — SIGNIFICANT CHANGE UP (ref 8.4–10.5)
CHLORIDE SERPL-SCNC: 103 MMOL/L — SIGNIFICANT CHANGE UP (ref 96–108)
CO2 SERPL-SCNC: 24 MMOL/L — SIGNIFICANT CHANGE UP (ref 22–31)
COLOR SPEC: ABNORMAL
CREAT SERPL-MCNC: 0.53 MG/DL — SIGNIFICANT CHANGE UP (ref 0.5–1.3)
DIFF PNL FLD: ABNORMAL
EGFR: 128 ML/MIN/1.73M2 — SIGNIFICANT CHANGE UP
EOSINOPHIL # BLD AUTO: 0.14 K/UL — SIGNIFICANT CHANGE UP (ref 0–0.5)
EOSINOPHIL NFR BLD AUTO: 1.7 % — SIGNIFICANT CHANGE UP (ref 0–6)
EPI CELLS # UR: SIGNIFICANT CHANGE UP /HPF (ref 0–5)
GIANT PLATELETS BLD QL SMEAR: PRESENT — SIGNIFICANT CHANGE UP
GLUCOSE SERPL-MCNC: 95 MG/DL — SIGNIFICANT CHANGE UP (ref 70–99)
GLUCOSE UR QL: NEGATIVE — SIGNIFICANT CHANGE UP
HCG SERPL-ACNC: <0 MIU/ML — SIGNIFICANT CHANGE UP
HCT VFR BLD CALC: 27.6 % — LOW (ref 34.5–45)
HCT VFR BLD CALC: 29.1 % — LOW (ref 34.5–45)
HGB BLD-MCNC: 7.7 G/DL — LOW (ref 11.5–15.5)
HGB BLD-MCNC: 8.1 G/DL — LOW (ref 11.5–15.5)
HYPOCHROMIA BLD QL: SIGNIFICANT CHANGE UP
INR BLD: 1.14 — SIGNIFICANT CHANGE UP (ref 0.88–1.16)
KETONES UR-MCNC: ABNORMAL MG/DL
LEUKOCYTE ESTERASE UR-ACNC: ABNORMAL
LYMPHOCYTES # BLD AUTO: 1.79 K/UL — SIGNIFICANT CHANGE UP (ref 1–3.3)
LYMPHOCYTES # BLD AUTO: 21.8 % — SIGNIFICANT CHANGE UP (ref 13–44)
MANUAL SMEAR VERIFICATION: SIGNIFICANT CHANGE UP
MCHC RBC-ENTMCNC: 18.6 PG — LOW (ref 27–34)
MCHC RBC-ENTMCNC: 18.7 PG — LOW (ref 27–34)
MCHC RBC-ENTMCNC: 27.8 GM/DL — LOW (ref 32–36)
MCHC RBC-ENTMCNC: 27.9 GM/DL — LOW (ref 32–36)
MCV RBC AUTO: 66.9 FL — LOW (ref 80–100)
MCV RBC AUTO: 67.2 FL — LOW (ref 80–100)
MICROCYTES BLD QL: SLIGHT — SIGNIFICANT CHANGE UP
MONOCYTES # BLD AUTO: 0.72 K/UL — SIGNIFICANT CHANGE UP (ref 0–0.9)
MONOCYTES NFR BLD AUTO: 8.7 % — SIGNIFICANT CHANGE UP (ref 2–14)
NEUTROPHILS # BLD AUTO: 5.57 K/UL — SIGNIFICANT CHANGE UP (ref 1.8–7.4)
NEUTROPHILS NFR BLD AUTO: 67.8 % — SIGNIFICANT CHANGE UP (ref 43–77)
NITRITE UR-MCNC: POSITIVE
NRBC # BLD: 0 /100 WBCS — SIGNIFICANT CHANGE UP (ref 0–0)
OVALOCYTES BLD QL SMEAR: SLIGHT — SIGNIFICANT CHANGE UP
PH UR: 6.5 — SIGNIFICANT CHANGE UP (ref 5–8)
PLAT MORPH BLD: ABNORMAL
PLATELET # BLD AUTO: 355 K/UL — SIGNIFICANT CHANGE UP (ref 150–400)
PLATELET # BLD AUTO: 364 K/UL — SIGNIFICANT CHANGE UP (ref 150–400)
POIKILOCYTOSIS BLD QL AUTO: SLIGHT — SIGNIFICANT CHANGE UP
POLYCHROMASIA BLD QL SMEAR: SLIGHT — SIGNIFICANT CHANGE UP
POTASSIUM SERPL-MCNC: 3.8 MMOL/L — SIGNIFICANT CHANGE UP (ref 3.5–5.3)
POTASSIUM SERPL-SCNC: 3.8 MMOL/L — SIGNIFICANT CHANGE UP (ref 3.5–5.3)
PROT UR-MCNC: >=300 MG/DL
PROTHROM AB SERPL-ACNC: 13.6 SEC — HIGH (ref 10.5–13.4)
RBC # BLD: 4.11 M/UL — SIGNIFICANT CHANGE UP (ref 3.8–5.2)
RBC # BLD: 4.35 M/UL — SIGNIFICANT CHANGE UP (ref 3.8–5.2)
RBC # FLD: 20.9 % — HIGH (ref 10.3–14.5)
RBC # FLD: 21.1 % — HIGH (ref 10.3–14.5)
RBC BLD AUTO: ABNORMAL
RBC CASTS # UR COMP ASSIST: ABNORMAL /HPF
SODIUM SERPL-SCNC: 136 MMOL/L — SIGNIFICANT CHANGE UP (ref 135–145)
SP GR SPEC: 1.02 — SIGNIFICANT CHANGE UP (ref 1–1.03)
UROBILINOGEN FLD QL: 1 E.U./DL — SIGNIFICANT CHANGE UP
WBC # BLD: 8.22 K/UL — SIGNIFICANT CHANGE UP (ref 3.8–10.5)
WBC # BLD: 9.19 K/UL — SIGNIFICANT CHANGE UP (ref 3.8–10.5)
WBC # FLD AUTO: 8.22 K/UL — SIGNIFICANT CHANGE UP (ref 3.8–10.5)
WBC # FLD AUTO: 9.19 K/UL — SIGNIFICANT CHANGE UP (ref 3.8–10.5)
WBC UR QL: < 5 /HPF — SIGNIFICANT CHANGE UP

## 2023-05-13 PROCEDURE — 87075 CULTR BACTERIA EXCEPT BLOOD: CPT

## 2023-05-13 PROCEDURE — 87070 CULTURE OTHR SPECIMN AEROBIC: CPT

## 2023-05-13 PROCEDURE — 85027 COMPLETE CBC AUTOMATED: CPT

## 2023-05-13 PROCEDURE — 84702 CHORIONIC GONADOTROPIN TEST: CPT

## 2023-05-13 PROCEDURE — 96374 THER/PROPH/DIAG INJ IV PUSH: CPT

## 2023-05-13 PROCEDURE — 80048 BASIC METABOLIC PNL TOTAL CA: CPT

## 2023-05-13 PROCEDURE — 85730 THROMBOPLASTIN TIME PARTIAL: CPT

## 2023-05-13 PROCEDURE — 85025 COMPLETE CBC W/AUTO DIFF WBC: CPT

## 2023-05-13 PROCEDURE — 85610 PROTHROMBIN TIME: CPT

## 2023-05-13 PROCEDURE — 36415 COLL VENOUS BLD VENIPUNCTURE: CPT

## 2023-05-13 PROCEDURE — 96375 TX/PRO/DX INJ NEW DRUG ADDON: CPT

## 2023-05-13 PROCEDURE — 76642 ULTRASOUND BREAST LIMITED: CPT | Mod: 26,LT

## 2023-05-13 PROCEDURE — 99284 EMERGENCY DEPT VISIT MOD MDM: CPT | Mod: 25

## 2023-05-13 PROCEDURE — 99285 EMERGENCY DEPT VISIT HI MDM: CPT

## 2023-05-13 PROCEDURE — 81001 URINALYSIS AUTO W/SCOPE: CPT

## 2023-05-13 PROCEDURE — 76642 ULTRASOUND BREAST LIMITED: CPT

## 2023-05-13 PROCEDURE — 87040 BLOOD CULTURE FOR BACTERIA: CPT

## 2023-05-13 RX ORDER — CEPHALEXIN 500 MG
1 CAPSULE ORAL
Qty: 28 | Refills: 0
Start: 2023-05-13 | End: 2023-05-19

## 2023-05-13 RX ORDER — VANCOMYCIN HCL 1 G
1000 VIAL (EA) INTRAVENOUS ONCE
Refills: 0 | Status: COMPLETED | OUTPATIENT
Start: 2023-05-13 | End: 2023-05-13

## 2023-05-13 RX ORDER — CEFAZOLIN SODIUM 1 G
2000 VIAL (EA) INJECTION ONCE
Refills: 0 | Status: COMPLETED | OUTPATIENT
Start: 2023-05-13 | End: 2023-05-13

## 2023-05-13 RX ORDER — KETOROLAC TROMETHAMINE 30 MG/ML
15 SYRINGE (ML) INJECTION ONCE
Refills: 0 | Status: DISCONTINUED | OUTPATIENT
Start: 2023-05-13 | End: 2023-05-13

## 2023-05-13 RX ADMIN — Medication 250 MILLIGRAM(S): at 09:41

## 2023-05-13 RX ADMIN — Medication 100 MILLIGRAM(S): at 10:50

## 2023-05-13 RX ADMIN — Medication 15 MILLIGRAM(S): at 09:30

## 2023-05-13 NOTE — ED ADULT NURSE NOTE - OBJECTIVE STATEMENT
Pt reports swelling/redness/tenderness to left breast x 1 week along w oozing purulent drainage. Worst in the last 3 days. Pt reports rx steroid injection to right armpit. Pt also reports non painful "gross hematuria" for 1-2 years ago. denies fever. Hx of hidradenitis.

## 2023-05-13 NOTE — ED PROVIDER NOTE - PATIENT PORTAL LINK FT
You can access the FollowMyHealth Patient Portal offered by [facility] by registering at the following website: [email or web address]. By joining [employer]’s FollowMyHealth portal, you will also be able to view your health information using other applications (apps) compatible with our system.

## 2023-05-13 NOTE — CONSULT NOTE ADULT - SUBJECTIVE AND OBJECTIVE BOX
Surgery Consult    Consulting Surgical Team:   [ ] Team 1 - Colorectal/Surgical Oncology ([phone])    [ ] Team 2 - MIS/Bariatric Surgery ([phone])     [ ] Team 4 - Acute Care Surgery ([phone])     [ x ] Team 5 - General Surgery/Breast/Pediatric Surgery ([phone])    Consulting Attending: Dr. Sorensen    HPI:  28yo Female pt with PMH hidradenitis suppurativa, Nutcracker syndrome, breast cyst, cystitis and PSH of cystoscopy presents with worseing LEFT breast pain for several days and enlarging breast cyst for 2 weeks. Patient was evaluated by dermatologist Dr Winslow on  for worsening HS administered IL kenalog into RIGHT axilla. Following evaluation noticed enlargement of known left breast cyst. Patient had scheduled appointment with Plastic Surgery this week with Dr. Marcial for surgical evaluation of b/l axillary HS and was offered drainage of LEFT breast cyst at that time, however she declined. Since then had worseing left breast pain and enlargement of cyst. Pain is worse around nipple, sensitive to touch constant. Pain associated with small volume purulent drainage from new area around cyst, however she denies new nipple retraction, nipple discharge, fevers, chills, chest pain, dyspnea, abdominal pain, nausea or vomiting. Pain improved with administration of Abx in emergency department. Patient states she has had intermittent hematuria, since original presentation in 2018 when she was diagnosed with Nutcracker syndrome, and continues to have blood in urine, not worsened over last several weeks. Denies history of breast abscesses requiring intervention, no breast biopsies. No family or personal history of breast cancer.        PMH: hidradenitis suppurativa, Nutcracker syndrome, breast cyst, cystitis, viridans group Strep UTI, Ecoli meningitis perinatally  PSH: cystoscopy (Sep 2018)  Meds: clindamycin topical  All: Bactrim and keflex (increased breast cyst size)  Social Hx: Daily PPD, Daily MJ use, no other recreational drug use  Family Hx: Denies family hx of IBS, Crohns, UC, or colon cancer. mother with diverticulitis and nephrolithiasis, nephew with crohn's disease. pt's great aunts with ovarian and pancreatic ca. No autoimmune diseases, no h/o hematuria or renal disease in any family members. No tuberous sclerosis. No hearing/vision problems    Functional capacity: >4 METS  Last colonoscopy/EGD: none    In the ED:   - Afebrile, nontachycardic, normotensive, and satting well on RA   - Labs significant for: WBC 8.22 K/uL Hgb 8.1 g/dL BUN 8 mg/dL Cr 0.53 mg/dL  - Imaging:   FINDINGS:  There is a 5.3 x 2.8 x 5.8 cm complex fluid collection in the upper inner   quadrant of the left breast with peripheral vascularity and soft tissue   edema. A small tract is noted between the fluid collection and the skin   surface.  Corresponds with clinical finding of a draining wound.  IMPRESSION:  Draining left breast abscess.  Discussed with ER staff.      Physical Exam  General: AAOx3, NAD, laying comfortably in bed  Breast and Axilla: Exam chaperoned by RN Dominique: LEFT breast UIQ fluctuant   Cardio: RRR  Pulm: non labored breathing  Abdomen: Soft, non distended, no rebound or guarding  Extremities: WWP                          8.1    8.22  )-----------( 364      ( 13 May 2023 09:48 )             29.1         136  |  103  |  8   ----------------------------<  95  3.8   |  24  |  0.53    Ca    9.1      13 May 2023 09:48        PT/INR - ( 13 May 2023 09:48 )   PT: 13.6 sec;   INR: 1.14          PTT - ( 13 May 2023 09:48 )  PTT:27.2 sec            Urinalysis Basic - ( 13 May 2023 10:18 )    Color: Red / Appearance: Cloudy / S.020 / pH: x  Gluc: x / Ketone: Trace mg/dL  / Bili: Negative / Urobili: 1.0 E.U./dL   Blood: x / Protein: >=300 mg/dL / Nitrite: POSITIVE   Leuk Esterase: Trace / RBC: Many /HPF / WBC < 5 /HPF   Sq Epi: x / Non Sq Epi: x / Bacteria: Present /HPF        MICRO:     IMAGING:  < from: US Breast Limited, Left (23 @ 10:45) >    ACC: [id number] EXAM:  US BREAST LIMITED LT   ORDERED BY: SAMANTHA HELLERMAN     PROCEDURE DATE:  2023          INTERPRETATION:  CLINICAL INDICATION:  Mastitis.  Rule out abscess.    TECHNIQUE:  Limited sonographic evaluation of the left breast was   performed, targeted draining wound. ?Evaluation was performed by the   technologist and submitted for interpretation..  This study was performed   exclusively for the purpose of excluding the presence of abscess in the   clinical setting of mastitis.?  ?  FINDINGS:  There is a 5.3 x 2.8 x 5.8 cm complex fluid collection in the upper inner   quadrant of the left breast with peripheral vascularity and soft tissue   edema. A small tract is noted between the fluid collection and the skin   surface.  Corresponds with clinical finding of a draining wound.    IMPRESSION:  Draining left breast abscess.  Discussed with ER staff.    --- End of Report ---          AIDEN HICKS MD; Resident Radiologist  This document has been electronically signed.  ANDREW SORENSEN MD; Attending Radiologist  This document has been electronically signed. May 13 2023  1:49PM    < end of copied text >

## 2023-05-13 NOTE — CONSULT NOTE ADULT - ASSESSMENT
28yo female daily smoker with HS, ?Nutcracker syndrome, known LEFT breast cyst presents with breast pain and hematuria. Afebrile, HDS, LEFT breast UIQ with fluctuance and erythema, nipple inversion unchanged from described baseline. Small punctate area of keratinization without expressible fluid initially. Labs demonstrate normal WBC, Hgb 8, BUN and Cr wnl, LEFT breast u/s with 5cm abscess. Following ultrasound patient experienced increased drainage from punctate area of described keratinization. ~20cc of seropurulent fluid was expressed from the breast with symptomatic improvement. Repeat bedside ultrasound demonstrated no remaining fluid collections. Impression is breast abscess, with adequate drainage. Hematuria unchanged from baseline, without abdominal pain or dysuria.     - no further Breast surgical intervention  - Patient has intolerance to Bactrim and Keflex, however tolerated Ancef in ED. Would discharge on 7 days Doxycycline or equivalent with MRSA coverage  - Should follow up with Dr. Tiburcio Sorensen this week for further evaluation and repeat imaging  - Smoking cessation counseling  - Defer workup of hematuria to Urology and ED  - Dispo per ED  - Surgery Team 5C will continue to follow if admitted. Please page Team 5 with questions/clinical changes. [phone]

## 2023-05-13 NOTE — CONSULT NOTE ADULT - PROBLEM SELECTOR RECOMMENDATION 9
*pending final recs* - final recs pending CTU - recommend CTU to evaluate possible cause of hematuria    - recommend vascular surgery consultation for history of Nutcracker syndrome   - recommend nephrology consultation   - recommend treatment of UTI given positive UA   - follow up with Dr. Simon outpatient for further workup     *final recs pending CTU* - recommend CTU to evaluate possible cause of hematuria    - recommend vascular surgery consultation for history of Nutcracker syndrome   - recommend nephrology consultation   - recommend treatment of UTI given positive UA   - follow up with Dr. Simon outpatient for further workup     - CTU recommended but unable to be obtained as patient refused and AMA'd from ED before workup could be completed despite risks explained to patient

## 2023-05-13 NOTE — ED ADULT NURSE NOTE - NSFALLRISKFACTORS_ED_ALL_ED
- s/p CTX 2g IV x 1  - Voriconazole s/p loading dose, continue 250mg q12 (8/2-   - Cefepime 50mg/kg q8hr (7/31-  - Vancomycin 15mg/kg q6hr (7/31-   - Blood culture sent   - RVP negative  - Monitor vitals, tylenol 325mg PRN for fever No indicators present - s/p CTX 2g IV x 1  - Voriconazole s/p loading dose, continue 250mg q12 (8/2-   - Cefepime 50mg/kg q8hr (7/31-  - Vancomycin 15mg/kg q6hr (7/31-   - Blood culture negative to date, next due at 20:30 on 8/4  - RVP negative  - csf pcr negative, csf cx negative  - Monitor vitals, tylenol 325mg PRN for fever  - Pan CT remarkable for b/l ground glass and nodular pul opcacities on 8/1  - s/p IR biopsy on 8/3, inadequate

## 2023-05-13 NOTE — ED PROVIDER NOTE - PHYSICAL EXAMINATION
CONSTITUTIONAL: Awake, alert.  Nontoxic, no acute distress.    HEAD: Normocephalic, atraumatic.    BREAST: L breast with large area of erythema, swelling, ttp to medial aspect of L breast with swelling/fluctuance to superior medial aspect of L nipple with pinpoint area of white purulence.  R breast normal appearing    HEART:  Normal rate, regular rhythm.  Heart sounds S1, S2.  No murmurs, rubs or gallops.    LUNGS:  No acute respiratory distress.  Non-tachypneic and non-labored.  Lungs are clear bilaterally with good aeration.  No wheezing, rales, rhonchi.

## 2023-05-13 NOTE — ED ADULT NURSE NOTE - NSFALLRISKINTERV_ED_ALL_ED

## 2023-05-13 NOTE — ED PROVIDER NOTE - PROVIDER TOKENS
PROVIDER:[TOKEN:[[id number]]],PROVIDER:[TOKEN:[[id number]]],PROVIDER:[TOKEN:[[id number]]],PROVIDER:[TOKEN:[[id number]]]

## 2023-05-13 NOTE — ED ADULT NURSE NOTE - NS ED NURSE DC INFO COMPLEXITY
Moderate: Comprehensive teaching/Complex: Multiple Rx/Tx. Pt has difficulty understanding. Requires additional help

## 2023-05-13 NOTE — ED PROVIDER NOTE - CARE PROVIDERS DIRECT ADDRESSES
,DirectAddress_Unknown,DirectAddress_Unknown,DirectAddress_Unknown,[email or web address]

## 2023-05-13 NOTE — ED ADULT NURSE REASSESSMENT NOTE - NS ED NURSE REASSESS COMMENT FT1
Pt refusing Ct scan. pt educated about the importance of study/find cause of hematuria. pt still refused. NATHANIEL lee aware.

## 2023-05-13 NOTE — ED PROVIDER NOTE - OBJECTIVE STATEMENT
26 y/o female w/ hx hidradenitis suppurativa p/w worsening L breast abscess x 2 wks.  States received steroid injection to R axilla approx 2 wks ago, then next day noticed swelling, redness, and pain that has been worsening over past 2 wks.  Has been taking ibuprofen intermittently for pain.  Reports hx of similar years ago, states did not require intervention.  States this is much worse.  Denies f/c, sob, n/v.  Denies trauma to area.

## 2023-05-13 NOTE — CONSULT NOTE ADULT - ASSESSMENT
26yo Female pt with PMH hidradenitis suppurativa, Nutcracker syndrome, breast cyst, cystitis and PSH of cystoscopy presents with worseing LEFT breast pain for several days and enlarging breast cyst for 2 weeks.  Urology consulted for hematuria since 2015 that was worked up in 2018 with CT urogram, urine cytology, cystoscopy which was negative. Since then patient endorse no change in symptoms.    28yo Female pt with PMH hidradenitis suppurativa, Nutcracker syndrome, breast cyst, cystitis and PSH of cystoscopy presents with worsening LEFT breast pain for several days and enlarging breast cyst for 2 weeks.  Urology consulted for hematuria since 2015 that was worked up in 2018 with CT urogram, urine cytology, cystoscopy which was negative. Since then patient endorses no change in symptoms.

## 2023-05-13 NOTE — ED PROVIDER NOTE - NS ED ROS FT
CONSTITUTIONAL: Denies fever and chills    RESPIRATORY: Denies SOB    CARDIOVASCULAR: Denies chest pain.    GASTROINTESTINAL: Denies abdominal pain, nausea, vomiting and diarrhea    SKIN: See HPI no

## 2023-05-13 NOTE — CONSULT NOTE ADULT - SUBJECTIVE AND OBJECTIVE BOX
Patient is a 27y old  Female who presents with a chief complaint of breast abscess    HPI:  28yo Female pt with PMH hidradenitis suppurativa, Nutcracker syndrome, breast cyst, cystitis and PSH of cystoscopy presents with worseing LEFT breast pain for several days and enlarging breast cyst for 2 weeks. Patient was evaluated by dermatologist Dr Winslow on 4/28 for worsening HS administered IL kenalog into RIGHT axilla. Following evaluation noticed enlargement of known left breast cyst. Patient had scheduled appointment with Plastic Surgery this week with Dr. Marcial for surgical evaluation of b/l axillary HS and was offered drainage of LEFT breast cyst at that time, however she declined. Since then had worseing left breast pain and enlargement of cyst. Pain is worse around nipple, sensitive to touch constant. Pain associated with small volume purulent drainage from new area around cyst, however she denies new nipple retraction, nipple discharge, fevers, chills, chest pain, dyspnea, abdominal pain, nausea or vomiting. Pain improved with administration of Abx in emergency department.     : As stated above. Urology consulted for persistent hematuria since 2015 and hemoglobin 7.7. Patient states since her workup by Dr. Simon and Dr. Boyer in 2018 her hematuria has remained unchanged. She states she urinate blood every time she urinates. States she believes it is more prominent at the beginning of her stream. Denies blood clots. Denies any change in hematuria during her menses. She states the color of the hematuria ranges from bright red to dark red. Denies difficulty urinating. Denies dysuria or difficulty emptying her bladder.     In the ED patient voided prior to exam and was not measured. Patient was then bladder scanned which showed 0cc.     Vital Signs Last 24 Hrs  T(C): 36.6 (13 May 2023 14:13), Max: 36.8 (13 May 2023 08:32)  T(F): 97.9 (13 May 2023 14:13), Max: 98.2 (13 May 2023 08:32)  HR: 79 (13 May 2023 14:13) (79 - 98)  BP: 97/58 (13 May 2023 14:13) (97/58 - 116/71)  BP(mean): --  RR: 18 (13 May 2023 14:13) (18 - 18)  SpO2: 100% (13 May 2023 14:13) (100% - 100%)    Parameters below as of 13 May 2023 14:13  Patient On (Oxygen Delivery Method): room air      I&O's Summary      PE:  Gen: alert and oriented. no acute distress   Abd: soft, non-tender, non-distended   : no suprapubic tenderness. no right CVAT. no left CVAT    LABS:                        7.7    9.19  )-----------( 355      ( 13 May 2023 13:21 )             27.6     05-13    136  |  103  |  8   ----------------------------<  95  3.8   |  24  |  0.53    Ca    9.1      13 May 2023 09:48      PT/INR - ( 13 May 2023 09:48 )   PT: 13.6 sec;   INR: 1.14          PTT - ( 13 May 2023 09:48 )  PTT:27.2 sec  Cultures       Patient is a 27y old  Female who presents with a chief complaint of breast abscess    HPI:  28yo Female pt with PMH hidradenitis suppurativa, Nutcracker syndrome, breast cyst, cystitis and PSH of cystoscopy presents with worsening LEFT breast pain for several days and enlarging breast cyst for 2 weeks. Patient was evaluated by dermatologist Dr Winslow on 4/28 for worsening HS administered IL kenalog into RIGHT axilla. Following evaluation noticed enlargement of known left breast cyst. Patient had scheduled appointment with Plastic Surgery this week with Dr. Marcial for surgical evaluation of b/l axillary HS and was offered drainage of LEFT breast cyst at that time, however she declined. Since then had worsening left breast pain and enlargement of cyst. Pain is worse around nipple, sensitive to touch constant. Pain associated with small volume purulent drainage from new area around cyst, however she denies new nipple retraction, nipple discharge, fevers, chills, chest pain, dyspnea, abdominal pain, nausea or vomiting. Pain improved with administration of Abx in emergency department.     : As stated above. Urology consulted for persistent hematuria since 2015 and hemoglobin 7.7. Patient states since her workup by Dr. Simon and Dr. Boyer in 2018 her hematuria has remained unchanged. She states she urinate blood every time she urinates. States she believes it is more prominent at the beginning of her stream. Denies blood clots. Denies any change in hematuria during her menses. She states the color of the hematuria ranges from bright red to dark red. Denies difficulty urinating. Denies dysuria or difficulty emptying her bladder.     In the ED patient voided prior to exam and was not measured. Patient was then bladder scanned which showed 0cc.     Vital Signs Last 24 Hrs  T(C): 36.6 (13 May 2023 14:13), Max: 36.8 (13 May 2023 08:32)  T(F): 97.9 (13 May 2023 14:13), Max: 98.2 (13 May 2023 08:32)  HR: 79 (13 May 2023 14:13) (79 - 98)  BP: 97/58 (13 May 2023 14:13) (97/58 - 116/71)  BP(mean): --  RR: 18 (13 May 2023 14:13) (18 - 18)  SpO2: 100% (13 May 2023 14:13) (100% - 100%)    Parameters below as of 13 May 2023 14:13  Patient On (Oxygen Delivery Method): room air      I&O's Summary      PE:  Gen: alert and oriented. no acute distress   Abd: soft, non-tender, non-distended   : no suprapubic tenderness. no right CVAT. no left CVAT    LABS:                        7.7    9.19  )-----------( 355      ( 13 May 2023 13:21 )             27.6     05-13    136  |  103  |  8   ----------------------------<  95  3.8   |  24  |  0.53    Ca    9.1      13 May 2023 09:48      PT/INR - ( 13 May 2023 09:48 )   PT: 13.6 sec;   INR: 1.14          PTT - ( 13 May 2023 09:48 )  PTT:27.2 sec  Cultures

## 2023-05-13 NOTE — ED PROVIDER NOTE - NSFOLLOWUPINSTRUCTIONS_ED_ALL_ED_FT
Thank you for visiting [facility] Emergency Department.      We saw you today for a breast abscess.  Please take antibiotics as prescribed and as directed.  Please follow up with BREAST SURGEON in 1 week.    You were also found to be anemic likely from the blood in your urine.  You also likely have a urine infection.  We wanted to do further testing including a CT UROGRAM, but you wished to leave.  Please follow up closely with a UROLOGIST, NEPHROLOGIST, and VASCULAR SURGEON for this.    Please know that no emergency visit is complete without follow-up with your primary care provider in 1 week.  Please bring copies of all discharge papers and results and show to your doctor.      Please continue taking all previous medications as instructed unless we discussed otherwise.     I appreciated your patience and hope you feel better soon.     Return to ER immediately if you develop fevers, chills, chest pain, shortness of breath, worsening bleeding, dizziness, and/or any concerning symptoms.

## 2023-05-13 NOTE — ED PROVIDER NOTE - CARE PROVIDER_API CALL
Tiburcio Sorensen)  Surgery  [address] [address]  Phone: [phone]  Fax: [phone]  Follow Up Time:     Lizeth Avendano)  Urology [facility]  [address] [place] [address]  Phone: [phone]  Fax: [phone]  Follow Up Time:     Zain Cintron)  Vascular Surgery  [address] [place] [address]  Phone: [phone]  Fax: [phone]  Follow Up Time:     Donna Boyer)  Nephrology  [address] [place] [address]  Phone: [phone]  Fax: [phone]  Follow Up Time:

## 2023-05-13 NOTE — ED PROVIDER NOTE - CLINICAL SUMMARY MEDICAL DECISION MAKING FREE TEXT BOX
28 y/o female w/ hx hidradenitis suppurativa p/w worsening L breast abscess x 2 wks.  States received steroid injection to R axilla approx 2 wks ago, then next day noticed swelling, redness, and pain that has been worsening over past 2 wks.  Has been taking ibuprofen intermittently for pain.  Reports hx of similar years ago, states did not require intervention.  States this is much worse.  Denies f/c, sob, n/v.  Denies trauma to area.  VSS, HR 98  Exam notable for L breast with large area of erythema, swelling, ttp to medial aspect of L breast with swelling/fluctuance to superior medial aspect of L nipple with pinpoint area of white purulence  Will get basic labs, us breast  Surg consult  IV ABX, pain control  Re-eval 28 y/o female w/ hx hidradenitis suppurativa p/w worsening L breast abscess x 2 wks.  States received steroid injection to R axilla approx 2 wks ago, then next day noticed swelling, redness, and pain that has been worsening over past 2 wks.  Has been taking ibuprofen intermittently for pain.  Reports hx of similar years ago, states did not require intervention.  States this is much worse.  Denies f/c, sob, n/v.  Denies trauma to area.  VSS, HR 98  Exam notable for L breast with large area of erythema, swelling, ttp to medial aspect of L breast with swelling/fluctuance to superior medial aspect of L nipple with pinpoint area of white purulence  Will get basic labs, us breast  Surg consult  IV ABX, pain control  Re-eval  --  Update:  -Breast US:  5.3 x 2.8 x 5.8 cm complex fluid collection in the upper inner quadrant of the left breast with peripheral vascularity and soft tissue edema  -Pt seen by gen surg, who drained abscess, rec'd abx  -Of note, labs notable for H/H 8.1/29.1 --> upon questioning pt, pt notes she has been having gross hematuria -pretty consistently since 2018, denies acute change.  Was seen by nephro, had ct urogram, and prior cystoscopy in 2018, carries dx of nutcracker syndrome.  Denies other sources of bleeding at time.  Denies dizziness, cp, sob, weakness.  Rpt CBC sent approx 3h later, H/H 7.7/27.6.  UA with gross hematuria, >300 protein, trace ketones, large blood, trace leuks, pos nitrite, bacteria present.  Uro consulted.  Uro rec'd CT urogram and consult to nephro/ vascular.  D/w pt uro recommendations, pt no longer wishes to stay in hospital.  States has been dealing with these symptoms, unchanged since 2018 and would like to pursue further evaluation outpatient.  Pt understands risks of leaving prior to completion w/u and wishes to leave AMA.  Signed AMA paperwork.  Will dc with po abx to cover breast infx and urine infx.  Pt given f/u with breast surgeon, nephro, uro, and vascular.  Strict return precautions provided

## 2023-05-15 ENCOUNTER — NON-APPOINTMENT (OUTPATIENT)
Age: 28
End: 2023-05-15

## 2023-05-15 PROBLEM — L73.2 HIDRADENITIS SUPPURATIVA: Chronic | Status: ACTIVE | Noted: 2023-05-13

## 2023-05-17 ENCOUNTER — APPOINTMENT (OUTPATIENT)
Dept: UROLOGY | Facility: CLINIC | Age: 28
End: 2023-05-17
Payer: COMMERCIAL

## 2023-05-17 ENCOUNTER — RESULT REVIEW (OUTPATIENT)
Age: 28
End: 2023-05-17

## 2023-05-17 ENCOUNTER — APPOINTMENT (OUTPATIENT)
Dept: UROLOGY | Facility: CLINIC | Age: 28
End: 2023-05-17

## 2023-05-17 VITALS
HEART RATE: 88 BPM | WEIGHT: 120 LBS | TEMPERATURE: 98.5 F | SYSTOLIC BLOOD PRESSURE: 106 MMHG | BODY MASS INDEX: 19.29 KG/M2 | DIASTOLIC BLOOD PRESSURE: 66 MMHG | HEIGHT: 66 IN | OXYGEN SATURATION: 100 %

## 2023-05-17 DIAGNOSIS — Z82.49 FAMILY HISTORY OF ISCHEMIC HEART DISEASE AND OTHER DISEASES OF THE CIRCULATORY SYSTEM: ICD-10-CM

## 2023-05-17 DIAGNOSIS — Z78.9 OTHER SPECIFIED HEALTH STATUS: ICD-10-CM

## 2023-05-17 PROCEDURE — 99204 OFFICE O/P NEW MOD 45 MIN: CPT

## 2023-05-17 RX ORDER — CEPHALEXIN 500 MG/1
500 CAPSULE ORAL
Refills: 0 | Status: ACTIVE | COMMUNITY

## 2023-05-17 RX ORDER — SULFAMETHOXAZOLE AND TRIMETHOPRIM 800; 160 MG/1; MG/1
TABLET ORAL
Refills: 0 | Status: ACTIVE | COMMUNITY

## 2023-05-17 NOTE — ASSESSMENT
[FreeTextEntry1] : 28 yo female with gross hematuria x 8 years. It has been five years since her initial evaluation. \par \par 1. UA, UCx, cytology\par 2. CT Urogram with venous phase\par 3. Renal doppler-- evaluate for renal vein compression/Nutcracker's\par 4. Refer back to nephrology and vascular-- there was discussion of renal bx and vascular repair\par 5. Discussed importance of follow up with these specialists given Hgb 7.7\par 6. Repeat cystoscopy\par 7. Complete bactrim given in ER. Keflex given for breast abscess.\par \par Follow up cysto with Dr Engle\par

## 2023-05-17 NOTE — HISTORY OF PRESENT ILLNESS
[FreeTextEntry1] : Chief Complaint: HFU/gross hematuria\par Date of first visit: 5/17/2023\par Accompanied by mother\par  \par \par ZAHRA PULIDO  is a 27 year old woman with PMHx hidradenitis suppurativa who presents for gross hematuria x 8 years. 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 She saw nephrology and vascular at the time and then lost to follow up. CT urogram and cystoscopy at the time were unremarkable however radiology re reviewed the scan to evaluate for renal vein compression. Unclear if there was an addendum made. She denies family hx of  malignancies. She is a current smoker (x10 years, 1 pack per week). Denies hx of recurrent UTI-- she had 2 UTIs at age 15.\par \par Presented to [facility] ER on 5/13/23 for breast abscess.  was consulted for hematuria. She left AMA.\par \par Hematuria has been persistent since 25lb weight loss in 2015. Urine color ranges from bright red to merlot. Denies clots, difficulty urinating or incomplete emptying, flank pain, fevers, dysuria. It occurs with every single void and is throughout the entire stream. She feels it has become a lighter red since starting bactrim after ER visit.\par \par 5/13/23 ER visit labwork reviewed\par Hgb 7.7  (no fatigue or SOB)\par Hct 27.1\par Cr 0.53\par UA abnormal, no UCx resulted \par Blood cx NGTD\par \par 9/26/18 cystoscopy unremarkable\par 8/24/18 CTU negative

## 2023-05-17 NOTE — PHYSICAL EXAM
[General Appearance - Well Developed] : well developed [General Appearance - Well Nourished] : well nourished [Normal Appearance] : normal appearance [Well Groomed] : well groomed [General Appearance - In No Acute Distress] : no acute distress [Edema] : no peripheral edema [Respiration, Rhythm And Depth] : normal respiratory rhythm and effort [Exaggerated Use Of Accessory Muscles For Inspiration] : no accessory muscle use [Abdomen Soft] : soft [Abdomen Tenderness] : non-tender [] : no hepato-splenomegaly [Costovertebral Angle Tenderness] : no ~M costovertebral angle tenderness [Normal Station and Gait] : the gait and station were normal for the patient's age [No Focal Deficits] : no focal deficits [Oriented To Time, Place, And Person] : oriented to person, place, and time [Affect] : the affect was normal [Mood] : the mood was normal [Not Anxious] : not anxious

## 2023-05-18 LAB
CULTURE RESULTS: SIGNIFICANT CHANGE UP
CULTURE RESULTS: SIGNIFICANT CHANGE UP
SPECIMEN SOURCE: SIGNIFICANT CHANGE UP
SPECIMEN SOURCE: SIGNIFICANT CHANGE UP

## 2023-05-23 LAB
APPEARANCE: ABNORMAL
BACTERIA UR CULT: NORMAL
BACTERIA: NEGATIVE /HPF
BILIRUBIN URINE: ABNORMAL
BLOOD URINE: ABNORMAL
CAST: 0 /LPF
COLOR: ABNORMAL
EPITHELIAL CELLS: 3 /HPF
GLUCOSE QUALITATIVE U: NEGATIVE MG/DL
KETONES URINE: NEGATIVE MG/DL
LEUKOCYTE ESTERASE URINE: ABNORMAL
MICROSCOPIC-UA: NORMAL
NITRITE URINE: POSITIVE
PH URINE: 5.5
PROTEIN URINE: 100 MG/DL
RED BLOOD CELLS URINE: ABNORMAL /HPF
REVIEW: NORMAL
SPECIFIC GRAVITY URINE: 1.02
URINE CYTOLOGY: NORMAL
UROBILINOGEN URINE: 0.2 MG/DL
WHITE BLOOD CELLS URINE: 27 /HPF

## 2023-05-25 ENCOUNTER — TRANSCRIPTION ENCOUNTER (OUTPATIENT)
Age: 28
End: 2023-05-25

## 2023-05-30 ENCOUNTER — TRANSCRIPTION ENCOUNTER (OUTPATIENT)
Age: 28
End: 2023-05-30

## 2023-06-01 ENCOUNTER — EMERGENCY (EMERGENCY)
Facility: HOSPITAL | Age: 28
LOS: 1 days | Discharge: HOME | End: 2023-06-01
Admitting: EMERGENCY MEDICINE
Payer: COMMERCIAL

## 2023-06-01 VITALS
HEIGHT: 66 IN | TEMPERATURE: 98 F | OXYGEN SATURATION: 98 % | DIASTOLIC BLOOD PRESSURE: 59 MMHG | RESPIRATION RATE: 18 BRPM | SYSTOLIC BLOOD PRESSURE: 116 MMHG | WEIGHT: 119.93 LBS | HEART RATE: 81 BPM

## 2023-06-01 DIAGNOSIS — N64.52 NIPPLE DISCHARGE: ICD-10-CM

## 2023-06-01 DIAGNOSIS — Z87.2 PERSONAL HISTORY OF DISEASES OF THE SKIN AND SUBCUTANEOUS TISSUE: ICD-10-CM

## 2023-06-01 DIAGNOSIS — R31.9 HEMATURIA, UNSPECIFIED: ICD-10-CM

## 2023-06-01 DIAGNOSIS — L73.2 HIDRADENITIS SUPPURATIVA: ICD-10-CM

## 2023-06-01 PROCEDURE — 99283 EMERGENCY DEPT VISIT LOW MDM: CPT

## 2023-06-01 NOTE — ED PROVIDER NOTE - CLINICAL SUMMARY MEDICAL DECISION MAKING FREE TEXT BOX
patient with chronic axillary scarring due to hidradenitis suppurativa, now with recurrent pain and swelling after completing course of antibiotics.  No cellulitis apparent, but there  is nodular swelling and tenderness.  Also has a history of breast abscesses, and has clear serous drainage from the left nipple.  She is seeing a surgeon for breast and axillary problems.  Will start doxycycline to treat hidradenitis suppurativa, and also refer her to a primary care for comprehensive medical care.

## 2023-06-01 NOTE — ED PROVIDER NOTE - OBJECTIVE STATEMENT
27-year-old female with a history of hidradenitis suppurativa, breast abscess -  has had recurrent infections in her axilla treated most recently with cephalexin; she improved on the antibiotic but after  completing the course axillary swelling and pain has again worsened.  She is seeing a surgeon in [place] for upcoming treatment of breast abscess and hidradenitis, but she does not have a primary care physician.  She reports some left nipple drainage that developed after recent visit to the ED; the drainage began after a breast ultrasound.  There is no family history of breast cancer in first-degree relatives.  She has never had a mammogram, but she believes she had MRI of the breast around 2018.  She used to go to a breast clinic, but has not been in a while.
IV discontinued, cath removed intact

## 2023-06-01 NOTE — ED ADULT NURSE NOTE - ABDOMEN
[de-identified] : General appearance: well nourished and hydrated, pleasant, alert and oriented x 3, cooperative.  Morbidly obese habitus.\par HEENT: normocephalic, EOM intact, wearing mask, external auditory canal clear.  \par Cardiovascular: bilateral 2+ pitting lower leg edema, no varicosities, dorsalis pedis pulses palpable and symmetric.  \par Lymphatics: no palpable lymphadenopathy, bilateral leg lymphedema.  \par Neurologic: sensation is normal, no muscle weakness in upper or lower extremities, patella tendon reflexes present and symmetric.  \par Dermatologic: skin moist, warm, no rash.  \par Spine: cervical spine with normal lordosis and painless range of motion, thoracic spine with normal kyphosis and painless range of motion, lumbosacral spine with normal lordosis and painful restricted range of motion.  Tenderness to palpation along midline lumbar spine and paraspinal musculature.  Sacroiliac joints tender bilaterally. Negative SLR and crossed SLR tests bilaterally.\par Gait: slow to stand and get started. Mild right antalgia.\par \par Limb lengths: 3-5 mm longer on right side\par \par Left hip: all fields negative/normal/unremarkable unless otherwise noted --\par - Focal soft tissue swelling: \par - Ecchymosis: \par - Erythema: \par - Wounds: \par - Tenderness: \par - ROM: 90\par   - Flexion:90 \par   - Extension: 0  \par   - Adduction: 10\par   - Abduction: 70\par   - Internal rotation in 90 degrees of hip flexion: 20\par   - External rotation in 90 degrees of hip flexion: 50\par - SENA: \par - FADIR: \par - Jared: \par - Stinchfield: \par - Flexor power: 4/5\par - Abductor power: 4/5\par \par Right hip: all fields negative/normal/unremarkable unless otherwise noted --\par - Focal soft tissue swelling: \par - Ecchymosis: \par - Erythema: \par - Wounds: well healed posterolateral incision, benign appearing \par - Tenderness: mild throughout buttock musculature\par - ROM: \par   - Flexion: 100 \par   - Extension: 20\par   - Adduction:20 \par   - Abduction: 60\par   - Internal rotation in 90 degrees of hip flexion: 15\par   - External rotation in 90 degrees of hip flexion: 80 \par - SENA: \par - FADIR: refers some pain to the buttock\par - Jared: \par - Stinchfield: \par - Flexor power: 4/5\par - Abductor power: 4/5 [de-identified] : R hip XRs taken on 4/11/22 at [facility] demonstrate a well fixed right SALLY in reasonable alignment without evidence of loosening or other mechanical complication. There is a somewhat abnormal/hypertrophic appearance of the greater trochanter which may be consistent with a healed prior fracture. soft/nondistended/nontender

## 2023-06-01 NOTE — ED ADULT TRIAGE NOTE - OTHER COMPLAINTS
Hx Hidradenitis suppurativa, unsure of antibiotic name. "I called and was told to come here for another antibiotic because the first one wasn't working". No other complaints today.

## 2023-06-01 NOTE — ED PROVIDER NOTE - PHYSICAL EXAMINATION
CONSTITUTIONAL: NAD   SKIN: Normal color and turgor.    HEAD: NC/AT.  EYES: Conjunctiva clear. EOMI. PERRL.    ENT: Airway clear. Normal voice.   RESPIRATORY:  Normal work of breathing. Lungs CTAB.  CARDIOVASCULAR:  RRR, S1S2. No M/R/G.      GI:  Abdomen soft, nontender.    MSK: Bilateral axillary skin nodules, swelling; no associated erythema, no drainage.  Bilateral nipple inversion, clear straw-colored serous drainage expressed from left nipple. No surrounding skin changes.    NEURO: Alert; CN: grossly intact. Speech clear.  LEO. Gait steady.

## 2023-06-01 NOTE — ED PROVIDER NOTE - NSFOLLOWUPCLINICS_GEN_ALL_ED_FT
[facility]  Family Medicine  [address]  [place], [place] [address]  Phone: [phone]  Fax:

## 2023-06-01 NOTE — ED ADULT NURSE NOTE - NSFALLUNIVINTERV_ED_ALL_ED
Bed/Stretcher in lowest position, wheels locked, appropriate side rails in place/Call bell, personal items and telephone in reach/Instruct patient to call for assistance before getting out of bed/chair/stretcher/Non-slip footwear applied when patient is off stretcher/[place] to call system/Physically safe environment - no spills, clutter or unnecessary equipment/Purposeful proactive rounding/Room/bathroom lighting operational, light cord in reach

## 2023-06-01 NOTE — ED PROVIDER NOTE - PATIENT PORTAL LINK FT
You can access the FollowMyHealth Patient Portal offered by [facility] by registering at the following website: [email or web address]. By joining [employer]’s FollowMyHealth portal, you will also be able to view your health information using other applications (apps) compatible with our system.

## 2023-06-01 NOTE — ED PROVIDER NOTE - NSFOLLOWUPINSTRUCTIONS_ED_ALL_ED_FT
Take the antibiotic as prescribed.  Please follow up at your breast clinic, as well as with your surgeon; you should also follow up with primary care provider for comprehensive medical care. You need to ensure the nipple drainage is not cancerous.  If you need help arranging follow up care, please call ED Referral Coordinator at [phone]    Return to the Emergency Department if you have any new or worsening symptoms, or if you have any concerns.

## 2023-06-01 NOTE — ED ADULT NURSE NOTE - OBJECTIVE STATEMENT
Pt is 27 y.o female pt came in to ED for unresolved underarm infection. PT was on cephalexin for 2 weeks, pt reports infection is coming back. Denies fever, chills, pain. Pt has hx of Hidradenitis suppurativa. No other complaints. Assessment ongoing. Will cont to monitor.

## 2023-06-05 ENCOUNTER — LABORATORY RESULT (OUTPATIENT)
Age: 28
End: 2023-06-05

## 2023-06-05 ENCOUNTER — NON-APPOINTMENT (OUTPATIENT)
Age: 28
End: 2023-06-05

## 2023-06-06 ENCOUNTER — APPOINTMENT (OUTPATIENT)
Dept: PLASTIC SURGERY | Facility: CLINIC | Age: 28
End: 2023-06-06
Payer: COMMERCIAL

## 2023-06-06 PROCEDURE — 13121 CMPLX RPR S/A/L 2.6-7.5 CM: CPT

## 2023-06-06 PROCEDURE — 11404 EXC TR-EXT B9+MARG 3.1-4 CM: CPT

## 2023-06-06 RX ORDER — OXYCODONE AND ACETAMINOPHEN 5; 325 MG/1; MG/1
5-325 TABLET ORAL
Qty: 16 | Refills: 0 | Status: ACTIVE | COMMUNITY
Start: 2023-06-06 | End: 1900-01-01

## 2023-06-06 NOTE — PROCEDURE
[FreeTextEntry6] : Preopdx:right upper arm mass\par Procedure: excisional biopsy   3.5cm mass of right upper extremity, and complex closure 3.5cm RLE\par Anesthesia: local 1% w/epi\par Specimens: to path on formalin\par No complications\par \par Summary:\par IC obtained.  Lesion demarcated with marking pen.  1%lido with epinephrine injected.  15 blade used to incise full thickness.    3.5Cm  lesion excised as an ellipse full thickness in subcutaneous plane.   Hemostasis obtained with cautery.  Skin edges widely undermined and closed for a complex closure of  3.5cm.  bacitracin and steristrips placed.  \par \par

## 2023-06-07 ENCOUNTER — TRANSCRIPTION ENCOUNTER (OUTPATIENT)
Age: 28
End: 2023-06-07

## 2023-06-12 ENCOUNTER — APPOINTMENT (OUTPATIENT)
Dept: UROLOGY | Facility: CLINIC | Age: 28
End: 2023-06-12
Payer: COMMERCIAL

## 2023-06-12 VITALS
DIASTOLIC BLOOD PRESSURE: 76 MMHG | SYSTOLIC BLOOD PRESSURE: 115 MMHG | HEART RATE: 56 BPM | OXYGEN SATURATION: 100 % | TEMPERATURE: 98.3 F

## 2023-06-12 PROCEDURE — 99213 OFFICE O/P EST LOW 20 MIN: CPT

## 2023-06-13 NOTE — ASSESSMENT
[FreeTextEntry1] : 28 yo female with gross hematuria x 8 years. It has been five years since her initial evaluation. She has not had a formal workup. \par \par 1. CT Urogram with venous phase\par 2. Renal doppler-- evaluate for renal vein compression/Nutcracker's\par 3. Refer back to nephrology and vascular-- there was discussion of renal bx and vascular repair\par 4. Discussed importance of follow up with these specialists given Hgb 7.7\par 5. Repeat cystoscopy\par 6. Cysto once imaging completed\par 7. Working on smoking cessation. Continue with this. 1 cigarette per day\par \par Follow up cysto after imaging\par \par Sincerely,\par \par \par Kendall Engle D.O.\par Professor of Urology and Radiology\par  of Urology at [facility]\par System Director for Prostate Cancer\par [address] [place] [address], 5th Floor [facility], [address]\par Phone: [phone]

## 2023-06-13 NOTE — ADDENDUM
[FreeTextEntry1] : I, Dr. Engle, personally performed the evaluation and management (E/M) services for this established patient who presents today with (a) new problem(s)/exacerbation of (an) existing condition(s).  That E/M includes conducting the examination, assessing all new/exacerbated conditions, and establishing a new plan of care.  Today, my ACP, Mirna Allen, was here to observe my evaluation and management services for this new problem/exacerbated condition to be followed going forward.\par

## 2023-06-13 NOTE — HISTORY OF PRESENT ILLNESS
[FreeTextEntry1] : Chief Complaint: HFU/gross hematuria\par Date of first visit: 5/17/2023\par \par  \par ZAHRA PULIDO  is a 27 year old woman with PMHx hidradenitis suppurativa who presents for gross hematuria x 8 years. 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 She saw nephrology and vascular at the time and then lost to follow up. CT urogram and cystoscopy at the time were unremarkable however radiology re reviewed the scan to evaluate for renal vein compression. Unclear if there was an addendum made. She denies family hx of  malignancies. She is a current smoker (x10 years, 1 pack per week). Denies hx of recurrent UTI-- she had 2 UTIs at age 15.\par \par Presented to [facility] ER on 5/13/23 for breast abscess.  was consulted for hematuria. She left AMA.\par \par Hematuria has been persistent since 25lb weight loss in 2015. Urine color ranges from bright red to merlot. Denies clots, difficulty urinating or incomplete emptying, flank pain, fevers, dysuria. It occurs with every single void and is throughout the entire stream. She feels it has become a lighter red since starting bactrim after ER visit.\par \par 5/13/23 ER visit labwork reviewed\par Hgb 7.7  (no fatigue or SOB)\par Hct 27.1\par Cr 0.53\par UA abnormal, no UCx resulted \par Blood cx NGTD\par \par 9/26/18 cystoscopy unremarkable\par 8/24/18 CTU negative

## 2023-06-14 ENCOUNTER — TRANSCRIPTION ENCOUNTER (OUTPATIENT)
Age: 28
End: 2023-06-14

## 2023-06-15 ENCOUNTER — APPOINTMENT (OUTPATIENT)
Dept: NEPHROLOGY | Facility: CLINIC | Age: 28
End: 2023-06-15
Payer: COMMERCIAL

## 2023-06-15 ENCOUNTER — LABORATORY RESULT (OUTPATIENT)
Age: 28
End: 2023-06-15

## 2023-06-15 ENCOUNTER — NON-APPOINTMENT (OUTPATIENT)
Age: 28
End: 2023-06-15

## 2023-06-15 VITALS
HEIGHT: 66 IN | WEIGHT: 120 LBS | BODY MASS INDEX: 19.29 KG/M2 | DIASTOLIC BLOOD PRESSURE: 56 MMHG | SYSTOLIC BLOOD PRESSURE: 98 MMHG | HEART RATE: 60 BPM

## 2023-06-15 DIAGNOSIS — D50.9 IRON DEFICIENCY ANEMIA, UNSPECIFIED: ICD-10-CM

## 2023-06-15 PROCEDURE — 99204 OFFICE O/P NEW MOD 45 MIN: CPT

## 2023-06-15 NOTE — ASSESSMENT
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  Her MCV of 67 suggest that her hemoglobin of 8 is due to iron deficiency.  I have ordered labs to include CBC, hemoglobin electrophoresis, iron/TIBC, ferritin level.  I suspect she is going to need an IV infusion of iron.  She will be evaluated shortly by vascular again.  Urologic evaluation has been negative and urine culture showed no infection.

## 2023-06-15 NOTE — HISTORY OF PRESENT ILLNESS
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  Her kidneys and bladder appear to be normal.  She was in the ER 1 month ago with gross hematuria, but she has no flank pain.  Her hemoglobin was 7.7-8.0.  Her MCV was 67 consistent with iron deficiency.  Renal function is normal with a creatinine of 0.53 and GFR of 128.  Her only symptoms are fatigue and coldness but she has become accustomed to those.  She is not on any iron supplement.  Her BP has been low normal.  There has been nothing to suggest IgA nephropathy, thin basement membrane, etc.  She consistently does power walking at a brisk rate and does not complain of SENIOR or chest pain.

## 2023-06-15 NOTE — PHYSICAL EXAM
[General Appearance - Alert] : alert [General Appearance - In No Acute Distress] : in no acute distress [PERRL With Normal Accommodation] : pupils were equal in size, round, and reactive to light [Sclera] : the sclera and conjunctiva were normal [Outer Ear] : the ears and nose were normal in appearance [Neck Appearance] : the appearance of the neck was normal [Neck Cervical Mass (___cm)] : no neck mass was observed [Jugular Venous Distention Increased] : there was no jugular-venous distention [Thyroid Diffuse Enlargement] : the thyroid was not enlarged [Thyroid Nodule] : there were no palpable thyroid nodules [Auscultation Breath Sounds / Voice Sounds] : lungs were clear to auscultation bilaterally [Heart Rate And Rhythm] : heart rate was normal and rhythm regular [Heart Sounds] : normal S1 and S2 [Heart Sounds Gallop] : no gallops [Murmurs] : no murmurs [Heart Sounds Pericardial Friction Rub] : no pericardial rub [Skin Color & Pigmentation] : normal skin color and pigmentation [Skin Turgor] : normal skin turgor [] : no rash [Deep Tendon Reflexes (DTR)] : deep tendon reflexes were 2+ and symmetric [No Focal Deficits] : no focal deficits [Oriented To Time, Place, And Person] : oriented to person, place, and time [Impaired Insight] : insight and judgment were intact [Affect] : the affect was normal

## 2023-06-19 ENCOUNTER — NON-APPOINTMENT (OUTPATIENT)
Age: 28
End: 2023-06-19

## 2023-06-19 ENCOUNTER — APPOINTMENT (OUTPATIENT)
Dept: BREAST CENTER | Facility: CLINIC | Age: 28
End: 2023-06-19
Payer: COMMERCIAL

## 2023-06-19 VITALS
DIASTOLIC BLOOD PRESSURE: 58 MMHG | BODY MASS INDEX: 19.61 KG/M2 | HEIGHT: 66 IN | SYSTOLIC BLOOD PRESSURE: 100 MMHG | WEIGHT: 122 LBS | HEART RATE: 58 BPM

## 2023-06-19 LAB
CYSTATIN C SERPL-MCNC: 0.74 MG/L
FERRITIN SERPL-MCNC: 4 NG/ML
GFR/BSA.PRED SERPLBLD CYS-BASED-ARV: 115 ML/MIN/1.73M2
HGB A MFR BLD: 98.2 %
HGB A2 MFR BLD: 1.8 %
HGB FRACT BLD-IMP: NORMAL
IRON SATN MFR SERPL: 5 %
IRON SERPL-MCNC: 18 UG/DL
TIBC SERPL-MCNC: 396 UG/DL
UIBC SERPL-MCNC: 378 UG/DL

## 2023-06-19 PROCEDURE — 99204 OFFICE O/P NEW MOD 45 MIN: CPT

## 2023-06-19 NOTE — PAST MEDICAL HISTORY
[Menarche Age ____] : age at menarche was [unfilled] [Approximately ___] : the LMP was approximately [unfilled] [Total Preg ___] : G[unfilled] [History of Hormone Replacement Treatment] : has no history of hormone replacement treatment [FreeTextEntry6] : NO [FreeTextEntry7] : NO [FreeTextEntry8] : N/A

## 2023-06-19 NOTE — HISTORY OF PRESENT ILLNESS
[FreeTextEntry1] : 26 yo female referred by [facility] ED presents for a left breast abscess; the patient presented to the ER on 6/1/23 with a red, painful left breast lump with progressive worsening. Patient states she has been able to palpate the lump in her left breast UIQ for the last 3 yeas, but states when she take Cephalexin for treatment of her hidradenitis suppurativa, it causes the lump to become hardened and "acts up". History of chronic intermittent left nipple inversion. Patient reports purulent breast and left nipple discharge at the time the targeted left breast US was being performed, states the breast abscess was draining during the US examination. Patient is s/p excision of HS lesions of right axilla by Dr. Volodymyr Hoffman on 6/6/23 s/p course of Cephalexin on 6/13/23. Patient most recently s/p 10 day course of Doxycycline following ER evaluation on 6/1/23.  We discussed that with recurrent abscesses there is consideration of surgical excision of the area of concern to decrease recurrence.  I explained to the patient that I would not recommend this at this time.  The patient does agree.  She will let me know in the future if there is any signs of recurrence.\par \par \par \par

## 2023-06-19 NOTE — DATA REVIEWED
[FreeTextEntry1] : 05/13/2023 ([facility]) left ultrasound: There is a 5.3 x 2.8 x 5.8 cm complex fluid collection in the upper inner quadrant of the left breast with peripheral vascularity and soft tissue edema. A small tract is noted between the fluid collection and the skin surface. Corresponds with clinical finding of a draining wound.\par IMPRESSION: Draining left breast abscess. Discussed with ER staff.

## 2023-06-19 NOTE — PHYSICAL EXAM
[Normocephalic] : normocephalic [EOMI] : extra ocular movement intact [Supple] : supple [Examined in the supine and seated position] : examined in the supine and seated position [Symmetrical] : symmetrical [No dominant masses] : no dominant masses in right breast  [No dominant masses] : no dominant masses left breast [No Nipple Retraction] : no left nipple retraction [No Nipple Discharge] : no left nipple discharge [No Axillary Lymphadenopathy] : no left axillary lymphadenopathy [No Rashes] : no rashes [de-identified] : Minimal scarring and some mild firmness, likely scar tissue at the 10-11 o'clock area of the areola.  No erythema, no drainage, no signs of active infection

## 2023-06-21 ENCOUNTER — APPOINTMENT (OUTPATIENT)
Dept: PLASTIC SURGERY | Facility: CLINIC | Age: 28
End: 2023-06-21
Payer: COMMERCIAL

## 2023-06-21 PROCEDURE — 99212 OFFICE O/P EST SF 10 MIN: CPT

## 2023-06-23 ENCOUNTER — OUTPATIENT (OUTPATIENT)
Dept: OUTPATIENT SERVICES | Facility: HOSPITAL | Age: 28
LOS: 1 days | End: 2023-06-23
Payer: COMMERCIAL

## 2023-06-23 ENCOUNTER — APPOINTMENT (OUTPATIENT)
Dept: ULTRASOUND IMAGING | Facility: HOSPITAL | Age: 28
End: 2023-06-23

## 2023-06-23 ENCOUNTER — NON-APPOINTMENT (OUTPATIENT)
Age: 28
End: 2023-06-23

## 2023-06-23 DIAGNOSIS — Z91.041 RADIOGRAPHIC DYE ALLERGY STATUS: ICD-10-CM

## 2023-06-23 PROCEDURE — 93975 VASCULAR STUDY: CPT

## 2023-06-23 PROCEDURE — 93975 VASCULAR STUDY: CPT | Mod: 26

## 2023-06-23 RX ORDER — METHYLPREDNISOLONE 32 MG/1
32 TABLET ORAL
Qty: 2 | Refills: 0 | Status: ACTIVE | COMMUNITY
Start: 2023-06-23 | End: 1900-01-01

## 2023-06-27 NOTE — HISTORY OF PRESENT ILLNESS
[FreeTextEntry1] : DOP 06/06/23 right axially mass, likely hidradenitis suppurativa procedure visit. \par path c/w cystic squamous tract, granulation tissue, dermal fibrosis, c/w HS\par pt had sig pain post procedure\par reports small area with swellling and drainage\par  \par

## 2023-06-28 ENCOUNTER — OUTPATIENT (OUTPATIENT)
Dept: OUTPATIENT SERVICES | Facility: HOSPITAL | Age: 28
LOS: 1 days | End: 2023-06-28
Payer: COMMERCIAL

## 2023-06-28 ENCOUNTER — APPOINTMENT (OUTPATIENT)
Dept: CT IMAGING | Facility: HOSPITAL | Age: 28
End: 2023-06-28
Payer: COMMERCIAL

## 2023-06-28 PROCEDURE — 74178 CT ABD&PLV WO CNTR FLWD CNTR: CPT | Mod: 26

## 2023-06-28 PROCEDURE — 82565 ASSAY OF CREATININE: CPT

## 2023-06-28 PROCEDURE — 74178 CT ABD&PLV WO CNTR FLWD CNTR: CPT

## 2023-07-04 RX ORDER — SULFAMETHOXAZOLE AND TRIMETHOPRIM 800; 160 MG/1; MG/1
800-160 TABLET ORAL TWICE DAILY
Qty: 10 | Refills: 0 | Status: ACTIVE | COMMUNITY
Start: 2023-07-04 | End: 1900-01-01

## 2023-07-04 RX ORDER — MUPIROCIN 20 MG/G
2 OINTMENT TOPICAL 3 TIMES DAILY
Qty: 1 | Refills: 1 | Status: ACTIVE | COMMUNITY
Start: 2023-07-04 | End: 1900-01-01

## 2023-07-06 ENCOUNTER — APPOINTMENT (OUTPATIENT)
Dept: PLASTIC SURGERY | Facility: CLINIC | Age: 28
End: 2023-07-06
Payer: COMMERCIAL

## 2023-07-06 PROCEDURE — 99212 OFFICE O/P EST SF 10 MIN: CPT

## 2023-07-07 NOTE — HISTORY OF PRESENT ILLNESS
[FreeTextEntry1] : DOP 06/06/23 \par S/P: right axially mass, likely hidradenitis suppurativa procedure visit. \par path c/w cystic squamous tract, granulation tissue, dermal fibrosis, c/w HS\par pt had sig pain post procedure\par now with swollen , painful area at base of scar. + redness and drainage.

## 2023-07-10 ENCOUNTER — APPOINTMENT (OUTPATIENT)
Dept: UROLOGY | Facility: CLINIC | Age: 28
End: 2023-07-10
Payer: COMMERCIAL

## 2023-07-10 VITALS
TEMPERATURE: 98.4 F | DIASTOLIC BLOOD PRESSURE: 53 MMHG | HEART RATE: 94 BPM | OXYGEN SATURATION: 100 % | SYSTOLIC BLOOD PRESSURE: 99 MMHG

## 2023-07-10 LAB
BILIRUB UR QL STRIP: NORMAL
CLARITY UR: NORMAL
COLLECTION METHOD: NORMAL
GLUCOSE UR-MCNC: NORMAL
HCG UR QL: 1 EU/DL
HGB UR QL STRIP.AUTO: NORMAL
KETONES UR-MCNC: NORMAL
LEUKOCYTE ESTERASE UR QL STRIP: NORMAL
NITRITE UR QL STRIP: NORMAL
PH UR STRIP: 8.5
PROT UR STRIP-MCNC: 300
SP GR UR STRIP: 1.02

## 2023-07-10 PROCEDURE — 81003 URINALYSIS AUTO W/O SCOPE: CPT | Mod: QW

## 2023-07-10 PROCEDURE — 99214 OFFICE O/P EST MOD 30 MIN: CPT

## 2023-07-11 NOTE — ASSESSMENT
[FreeTextEntry1] : 26 yo female with gross hematuria x 8 years. It has been five years since her initial evaluation. She has not had a formal workup. \par \par 1. CT Urogram and renal doppler reviewed- confirmed Nutcracker's\par 2. Refer back to vascular surgery- Dr Garcia to discuss options.\par 3. Discussed importance of follow up with these specialists given Hgb 7.7\par 4. Repeat cystoscopy if needed but unable to preform in the office due to retracted urethra.  \par 5. Working on smoking cessation. Continue with this. 1 cigarette per day\par \par Sincerely,\par \par \par Kendall Engle D.O.\par Professor of Urology and Radiology\par  of Urology at [facility]\par System Director for Prostate Cancer\par [address] [place] [address], 5[address] [facility], [address]\par Phone: [phone]

## 2023-07-11 NOTE — HISTORY OF PRESENT ILLNESS
[FreeTextEntry1] : Chief Complaint: HFU/gross hematuria\par Date of first visit: 5/17/2023\par \par  \par ZAHRA PULIDO  is a 27 year old woman with PMHx hidradenitis suppurativa who presents for gross hematuria x 8 years. 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 She saw nephrology and vascular at the time and then lost to follow up. CT urogram and cystoscopy at the time were unremarkable however radiology re reviewed the scan to evaluate for renal vein compression. Unclear if there was an addendum made. She denies family hx of  malignancies. She is a current smoker (x10 years, 1 pack per week). Denies hx of recurrent UTI-- she had 2 UTIs at age 15.\par \par Presented to [facility] ER on 5/13/23 for breast abscess.  was consulted for hematuria. She left AMA.\par \par Hematuria has been persistent since 25lb weight loss in 2015. Urine color ranges from bright red to merlot. Denies clots, difficulty urinating or incomplete emptying, flank pain, fevers, dysuria. It occurs with every single void and is throughout the entire stream. She feels it has become a lighter red since starting bactrim after ER visit.\par \par 5/13/23 ER visit labwork reviewed\par Hgb 7.7  (no fatigue or SOB)\par Hct 27.1\par Cr 0.53\par UA abnormal, no UCx resulted \par Blood cx NGTD\par \par 9/26/18 cystoscopy unremarkable\par 8/24/18 CTU negative\par \par Ultrasound Hx:\par 06/23/2023 US Abdomen. Mesoaortic left renal compression "nutcracker anatomy". Correlate clinically with nutracker syndrome. No left pelvic varicosities to suggest pelvic congestion.\par \par CT Hx:\par 06/28/2023 CT Abdomen and Pelvis Urogram. A couple of tiny nonobstructive right kidney stones. No hydronephrosis. No evidence of renal vein compression.\par \par 08/24/2018 CT Abdomen and Pelvis. No renal, ureteric or bladder calculus. No renal mass. Unremarkable bladder.

## 2023-09-26 ENCOUNTER — APPOINTMENT (OUTPATIENT)
Dept: PLASTIC SURGERY | Facility: CLINIC | Age: 28
End: 2023-09-26
Payer: COMMERCIAL

## 2023-09-26 PROCEDURE — 99213 OFFICE O/P EST LOW 20 MIN: CPT

## 2023-09-27 ENCOUNTER — APPOINTMENT (OUTPATIENT)
Dept: NEPHROLOGY | Facility: CLINIC | Age: 28
End: 2023-09-27
Payer: COMMERCIAL

## 2023-09-27 VITALS — WEIGHT: 122 LBS | BODY MASS INDEX: 19.61 KG/M2 | HEIGHT: 66 IN

## 2023-09-27 DIAGNOSIS — I87.1 COMPRESSION OF VEIN: ICD-10-CM

## 2023-09-27 DIAGNOSIS — D50.9 IRON DEFICIENCY ANEMIA, UNSPECIFIED: ICD-10-CM

## 2023-09-27 DIAGNOSIS — R31.0 GROSS HEMATURIA: ICD-10-CM

## 2023-09-27 PROCEDURE — 99443: CPT

## 2023-12-04 ENCOUNTER — APPOINTMENT (OUTPATIENT)
Dept: BREAST CENTER | Facility: CLINIC | Age: 28
End: 2023-12-04

## 2023-12-04 ENCOUNTER — NON-APPOINTMENT (OUTPATIENT)
Age: 28
End: 2023-12-04

## 2024-03-18 ENCOUNTER — EMERGENCY (EMERGENCY)
Facility: HOSPITAL | Age: 29
LOS: 1 days | Discharge: HOME | End: 2024-03-18
Attending: EMERGENCY MEDICINE | Admitting: EMERGENCY MEDICINE
Payer: COMMERCIAL

## 2024-03-18 VITALS
DIASTOLIC BLOOD PRESSURE: 71 MMHG | TEMPERATURE: 98 F | HEART RATE: 75 BPM | SYSTOLIC BLOOD PRESSURE: 106 MMHG | OXYGEN SATURATION: 99 % | RESPIRATION RATE: 16 BRPM

## 2024-03-18 VITALS
SYSTOLIC BLOOD PRESSURE: 111 MMHG | OXYGEN SATURATION: 100 % | TEMPERATURE: 98 F | HEART RATE: 74 BPM | HEIGHT: 67 IN | DIASTOLIC BLOOD PRESSURE: 70 MMHG | RESPIRATION RATE: 16 BRPM | WEIGHT: 119.93 LBS

## 2024-03-18 DIAGNOSIS — L73.2 HIDRADENITIS SUPPURATIVA: ICD-10-CM

## 2024-03-18 DIAGNOSIS — N83.202 UNSPECIFIED OVARIAN CYST, LEFT SIDE: ICD-10-CM

## 2024-03-18 DIAGNOSIS — R10.9 UNSPECIFIED ABDOMINAL PAIN: ICD-10-CM

## 2024-03-18 DIAGNOSIS — R31.9 HEMATURIA, UNSPECIFIED: ICD-10-CM

## 2024-03-18 LAB
ANION GAP SERPL CALC-SCNC: 12 MMOL/L — SIGNIFICANT CHANGE UP (ref 5–17)
APPEARANCE UR: ABNORMAL
BACTERIA # UR AUTO: NEGATIVE /HPF — SIGNIFICANT CHANGE UP
BASOPHILS # BLD AUTO: 0.02 K/UL — SIGNIFICANT CHANGE UP (ref 0–0.2)
BASOPHILS NFR BLD AUTO: 0.4 % — SIGNIFICANT CHANGE UP (ref 0–2)
BILIRUB UR-MCNC: ABNORMAL
BUN SERPL-MCNC: 12 MG/DL — SIGNIFICANT CHANGE UP (ref 7–23)
CALCIUM SERPL-MCNC: 9.8 MG/DL — SIGNIFICANT CHANGE UP (ref 8.4–10.5)
CAST: 2 /LPF — SIGNIFICANT CHANGE UP (ref 0–4)
CHLORIDE SERPL-SCNC: 104 MMOL/L — SIGNIFICANT CHANGE UP (ref 96–108)
CO2 SERPL-SCNC: 25 MMOL/L — SIGNIFICANT CHANGE UP (ref 22–31)
COLOR SPEC: ABNORMAL
CREAT SERPL-MCNC: 1.02 MG/DL — SIGNIFICANT CHANGE UP (ref 0.5–1.3)
DIFF PNL FLD: ABNORMAL
EGFR: 76 ML/MIN/1.73M2 — SIGNIFICANT CHANGE UP
EOSINOPHIL # BLD AUTO: 0.12 K/UL — SIGNIFICANT CHANGE UP (ref 0–0.5)
EOSINOPHIL NFR BLD AUTO: 2.1 % — SIGNIFICANT CHANGE UP (ref 0–6)
GLUCOSE SERPL-MCNC: 107 MG/DL — HIGH (ref 70–99)
GLUCOSE UR QL: NEGATIVE MG/DL — SIGNIFICANT CHANGE UP
HCG SERPL-ACNC: <1 MIU/ML — SIGNIFICANT CHANGE UP
HCT VFR BLD CALC: 42.7 % — SIGNIFICANT CHANGE UP (ref 34.5–45)
HGB BLD-MCNC: 14.5 G/DL — SIGNIFICANT CHANGE UP (ref 11.5–15.5)
IMM GRANULOCYTES NFR BLD AUTO: 0.4 % — SIGNIFICANT CHANGE UP (ref 0–0.9)
KETONES UR-MCNC: 40 MG/DL
LEUKOCYTE ESTERASE UR-ACNC: ABNORMAL
LYMPHOCYTES # BLD AUTO: 1.33 K/UL — SIGNIFICANT CHANGE UP (ref 1–3.3)
LYMPHOCYTES # BLD AUTO: 23.8 % — SIGNIFICANT CHANGE UP (ref 13–44)
MCHC RBC-ENTMCNC: 32.3 PG — SIGNIFICANT CHANGE UP (ref 27–34)
MCHC RBC-ENTMCNC: 34 GM/DL — SIGNIFICANT CHANGE UP (ref 32–36)
MCV RBC AUTO: 95.1 FL — SIGNIFICANT CHANGE UP (ref 80–100)
MONOCYTES # BLD AUTO: 0.66 K/UL — SIGNIFICANT CHANGE UP (ref 0–0.9)
MONOCYTES NFR BLD AUTO: 11.8 % — SIGNIFICANT CHANGE UP (ref 2–14)
MUCOUS THREADS # UR AUTO: PRESENT
NEUTROPHILS # BLD AUTO: 3.44 K/UL — SIGNIFICANT CHANGE UP (ref 1.8–7.4)
NEUTROPHILS NFR BLD AUTO: 61.5 % — SIGNIFICANT CHANGE UP (ref 43–77)
NITRITE UR-MCNC: NEGATIVE — SIGNIFICANT CHANGE UP
NRBC # BLD: 0 /100 WBCS — SIGNIFICANT CHANGE UP (ref 0–0)
PH UR: 5.5 — SIGNIFICANT CHANGE UP (ref 5–8)
PLATELET # BLD AUTO: 241 K/UL — SIGNIFICANT CHANGE UP (ref 150–400)
POTASSIUM SERPL-MCNC: 4 MMOL/L — SIGNIFICANT CHANGE UP (ref 3.5–5.3)
POTASSIUM SERPL-SCNC: 4 MMOL/L — SIGNIFICANT CHANGE UP (ref 3.5–5.3)
PROT UR-MCNC: 30 MG/DL
RBC # BLD: 4.49 M/UL — SIGNIFICANT CHANGE UP (ref 3.8–5.2)
RBC # FLD: 14.3 % — SIGNIFICANT CHANGE UP (ref 10.3–14.5)
RBC CASTS # UR COMP ASSIST: 1746 /HPF — HIGH (ref 0–4)
SODIUM SERPL-SCNC: 141 MMOL/L — SIGNIFICANT CHANGE UP (ref 135–145)
SP GR SPEC: >1.03 — HIGH (ref 1–1.03)
SQUAMOUS # UR AUTO: 5 /HPF — SIGNIFICANT CHANGE UP (ref 0–5)
UROBILINOGEN FLD QL: 1 MG/DL — SIGNIFICANT CHANGE UP (ref 0.2–1)
WBC # BLD: 5.59 K/UL — SIGNIFICANT CHANGE UP (ref 3.8–10.5)
WBC # FLD AUTO: 5.59 K/UL — SIGNIFICANT CHANGE UP (ref 3.8–10.5)
WBC UR QL: 4 /HPF — SIGNIFICANT CHANGE UP (ref 0–5)

## 2024-03-18 PROCEDURE — 76830 TRANSVAGINAL US NON-OB: CPT

## 2024-03-18 PROCEDURE — 81001 URINALYSIS AUTO W/SCOPE: CPT

## 2024-03-18 PROCEDURE — 87086 URINE CULTURE/COLONY COUNT: CPT

## 2024-03-18 PROCEDURE — 76856 US EXAM PELVIC COMPLETE: CPT

## 2024-03-18 PROCEDURE — 36415 COLL VENOUS BLD VENIPUNCTURE: CPT

## 2024-03-18 PROCEDURE — 76830 TRANSVAGINAL US NON-OB: CPT | Mod: 26

## 2024-03-18 PROCEDURE — 84702 CHORIONIC GONADOTROPIN TEST: CPT

## 2024-03-18 PROCEDURE — 76856 US EXAM PELVIC COMPLETE: CPT | Mod: 26

## 2024-03-18 PROCEDURE — 85025 COMPLETE CBC W/AUTO DIFF WBC: CPT

## 2024-03-18 PROCEDURE — 99285 EMERGENCY DEPT VISIT HI MDM: CPT

## 2024-03-18 PROCEDURE — 80048 BASIC METABOLIC PNL TOTAL CA: CPT

## 2024-03-18 PROCEDURE — 99284 EMERGENCY DEPT VISIT MOD MDM: CPT | Mod: 25

## 2024-03-18 NOTE — ED PROVIDER NOTE - PROGRESS NOTE DETAILS
labs unremarkable  UA w blood (expected), otherwise no infection  tvus w/o acute findings.    reviewed results w pt. reassured by unremarkable workup. pain controlled. rpt abd exam benign. ok for dc and outpt fyn / uro f/u.     All results reviewed with the patient verbally. Discharge plan and return precautions d/w pt who verbalized understanding and agrees with plan. All questions answered. Vitals WNL. Ready for d/c.

## 2024-03-18 NOTE — ED PROVIDER NOTE - OBJECTIVE STATEMENT
28 yr old female, history of hidradenitis suppurativa, Nutcracker syndrome, presents to the Emergency Department w abd pain / vaginal spotting. pt finished normal menses last week (3/8). yesterday had vaginal bleeding, amount of normal period. also had lower abd cramping similar to menses. was concerned bc she was bleeding between periods. now bleeding improved to spotting. no cramping currently - took tylenol and motrin pta. today also noticed red tinged urine which is typical of her nutcracker syndrome. no dysuria, urgency / frequency.  no fever, uri symptoms, n/v/d, vaginal discharge, flank pain.

## 2024-03-18 NOTE — ED PROVIDER NOTE - ATTENDING APP SHARED VISIT CONTRIBUTION OF CARE
agree with TSERING patient is not in any pain came in for spotting is well-appearing hemoglobin within normal limits abdomen is soft on exam given return precautions should she develop any pelvic pain has follow-up with GYN hCG negative

## 2024-03-18 NOTE — ED ADULT NURSE NOTE - CHPI ED NUR AGGRAVATING FX
----- Message from Coco Odom sent at 4/26/2022  2:16 PM CDT -----  Regarding: advice  Contact: wife  Type: Needs Medical Advice  Who Called:  wife-Charisse  Symptoms (please be specific):    How long has patient had these symptoms:    Pharmacy name and phone #:    Best Call Back Number: [phone]  Additional Information: Patient fell at home, the wife is taking the pt to [facility].        
----- Message from Doris Palacio sent at 4/26/2022 11:02 AM CDT -----  Type:  Patient Returning Call    Who Called:  Wife Charisse  Who Left Message for Patient:  Shalonda  Does the patient know what this is regarding?:  additional info  Best Call Back Number:  [phone]  Additional Information:  thank you    
Spoke with Patient's wife Charisse. She states patient fell again at home and is in a lot of pain. She called EMS and they brought him to [facility] ED to be evaluated.   
none

## 2024-03-18 NOTE — ED PROVIDER NOTE - PATIENT PORTAL LINK FT
You can access the FollowMyHealth Patient Portal offered by [facility] by registering at the following website: [email or web address]. By joining [facility]’s FollowMyHealth portal, you will also be able to view your health information using other applications (apps) compatible with our system.

## 2024-03-18 NOTE — ED ADULT TRIAGE NOTE - CHIEF COMPLAINT QUOTE
Pt reports vaginal bleeding, blood in urine and pelvic pain x1 day. Pt reports hx of "nutcracker syndrome." LMP was 3/8/24.

## 2024-03-18 NOTE — ED ADULT NURSE NOTE - OBJECTIVE STATEMENT
Received ambulatory with steady gait with chief complaints of vaginal bleeding since yesterday. "I noticed vaginal bleeding yesterday. My last menstrual period was 3/8/24. Its normal for me to have a dark urine, because of my nutcracker syndrome. I get blood transfusions for it, my last one was couple of months ago." Pt denies fever, chills, burning or frequency urinating.     Patient AOX4, speaking full sentences. Patient denies chest pain, shortness of breath, difficulty breathing and any form of distress not noted. Resps even and nonlabored. Moves all extremities. No obvious trauma/injury/deformity noted. Patient oriented to ED area. All needs attended. POC reviewed. Purposeful proactive hourly rounding in progress.

## 2024-03-18 NOTE — ED PROVIDER NOTE - CLINICAL SUMMARY MEDICAL DECISION MAKING FREE TEXT BOX
history of hidradenitis suppurativa, Nutcracker syndrome, here w vaginal bleeding outside of normal menses. also lower abd cramping. also red tinged urine - which she notes is typical of her nutcracker syndrome - no other urinary symptoms.  pt well appearing, stable vitals, exam unremarkable - no abd tenderness, no CVAT  dysfunctional uterine bleeding  cramping - r/o cyst, doubt torsion or cyst rupture  hematuria - known hx of nutcracker syndrome, r/o uti, clinically not pyelo  plan - labs, ua, tvus  analgesia prn  serial abd exams

## 2024-03-19 LAB
CULTURE RESULTS: NO GROWTH — SIGNIFICANT CHANGE UP
SPECIMEN SOURCE: SIGNIFICANT CHANGE UP

## 2024-04-02 ENCOUNTER — NON-APPOINTMENT (OUTPATIENT)
Age: 29
End: 2024-04-02

## 2024-04-03 ENCOUNTER — APPOINTMENT (OUTPATIENT)
Dept: PLASTIC SURGERY | Facility: CLINIC | Age: 29
End: 2024-04-03
Payer: COMMERCIAL

## 2024-04-03 PROCEDURE — 99212 OFFICE O/P EST SF 10 MIN: CPT

## 2024-04-03 NOTE — HISTORY OF PRESENT ILLNESS
[FreeTextEntry1] : Problem - Hidradenitis suppurativa under bilateral axilla. Noted 7/8 years ago. Patient has been seen by Dermatology. Dr Nayla Chacon Previous treatments - Steroid injection 5/5/23 and Topicial 1% gel, and antibiotic soap. Previous excision of right axially mass excision -hidradenitis suppurativa  Now would like the left axilla done. She reports no itching, bleeding, or drainage from right axilla. No imaging/Biopsy.  Slowly growing, no change in color.  No Infection or inflammation.  Discomfort.  No personal hx of skin cancer, masses.  No family hx of skin cancer.  The right axilla lesion is well-healed although the scar is dark purple below

## 2024-04-08 ENCOUNTER — APPOINTMENT (OUTPATIENT)
Dept: BREAST CENTER | Facility: CLINIC | Age: 29
End: 2024-04-08
Payer: COMMERCIAL

## 2024-04-08 DIAGNOSIS — N61.1 ABSCESS OF THE BREAST AND NIPPLE: ICD-10-CM

## 2024-04-08 PROCEDURE — 99214 OFFICE O/P EST MOD 30 MIN: CPT

## 2024-04-08 NOTE — PHYSICAL EXAM
[Normocephalic] : normocephalic [EOMI] : extra ocular movement intact [Supple] : supple [Examined in the supine and seated position] : examined in the supine and seated position [Symmetrical] : symmetrical [No dominant masses] : no dominant masses in right breast  [No dominant masses] : no dominant masses left breast [No Nipple Retraction] : no left nipple retraction [No Nipple Discharge] : no left nipple discharge [No Axillary Lymphadenopathy] : no left axillary lymphadenopathy [No Rashes] : no rashes [No Supraclavicular Adenopathy] : no supraclavicular adenopathy [de-identified] : Minimal scarring and scabbing at the 9 to 10 o'clock position of the nipple areolar complex,  No erythema, no drainage, no signs of active infection

## 2024-04-08 NOTE — PLAN
[TextEntry] : Follow-up left breast sonogram due now Patient to continue to follow-up with plastic surgery Referral to dermatology Patient to follow-up in 6 months for clinical breast exam, unless sonogram performed now returns as abnormal

## 2024-04-08 NOTE — DATA REVIEWED
[FreeTextEntry1] : 05/13/2023 ([facility]) left ultrasound: There is a 5.3 x 2.8 x 5.8 cm complex fluid collection in the upper inner quadrant of the left breast with peripheral vascularity and soft tissue edema. A small tract is noted between the fluid collection and the skin surface. Corresponds with clinical finding of a draining wound. IMPRESSION: Draining left breast abscess. Discussed with ER staff.

## 2024-04-08 NOTE — HISTORY OF PRESENT ILLNESS
[FreeTextEntry1] : 29 yo female presents for follow-up of a resolved left breast abscess. Patient presented to the ER on 6/1/23 with a red, painful left breast lump with progressive worsening. Patient states she has been able to palpate the lump in her left breast UIQ for the last 3 yeas, but states when she take Cephalexin for treatment of her hidradenitis suppurativa, it causes the lump to become hardened and "acts up". History of chronic intermittent left nipple inversion. Patient reports purulent breast and left nipple discharge at the time the targeted left breast US was being performed, states the breast abscess was draining during the US examination. Patient is s/p excision of HS lesions of right axilla by Dr. Volodymyr Hoffman on 6/6/23 s/p course of Cephalexin on 6/13/23. Patient s/p 10 day course of Doxycycline following ER evaluation on 6/1/23.  We discussed that with recurrent abscesses there is consideration of surgical excision of the area of concern to decrease recurrence.  I explained to the patient that I would not recommend this at this time.  The patient does agree.  She will let me know in the future if there is any signs of recurrence.  The patient is past due for a targeted ultrasound of the left breast for further evaluation status post resolved left breast abscess.  The patient will proceed with the ultrasound.  She is also asking for dermatology consult given her history of HS. of note, the patient is currently planning to have an excision of the lesions in her left axilla.  She asked if there can be a eversion of her left nipple performed at that time.  I explained to the patient that she would have to clear her infection and then discuss this with her plastic surgeon.

## 2024-04-09 ENCOUNTER — NON-APPOINTMENT (OUTPATIENT)
Age: 29
End: 2024-04-09

## 2024-04-10 ENCOUNTER — APPOINTMENT (OUTPATIENT)
Dept: DERMATOLOGY | Facility: CLINIC | Age: 29
End: 2024-04-10
Payer: COMMERCIAL

## 2024-04-10 DIAGNOSIS — L70.0 ACNE VULGARIS: ICD-10-CM

## 2024-04-10 PROCEDURE — 99214 OFFICE O/P EST MOD 30 MIN: CPT

## 2024-04-10 RX ORDER — DOXYCYCLINE HYCLATE 100 MG/1
100 CAPSULE ORAL
Qty: 60 | Refills: 2 | Status: ACTIVE | COMMUNITY
Start: 2024-04-10 | End: 1900-01-01

## 2024-04-10 RX ORDER — TRETINOIN 0.5 MG/G
0.05 CREAM TOPICAL
Qty: 1 | Refills: 2 | Status: ACTIVE | COMMUNITY
Start: 2024-04-10 | End: 1900-01-01

## 2024-04-10 NOTE — PHYSICAL EXAM
[Alert] : alert [Oriented x 3] : ~L oriented x 3 [Well Nourished] : well nourished [Conjunctiva Non-injected] : conjunctiva non-injected [No Visual Lymphadenopathy] : no visual  lymphadenopathy [No Clubbing] : no clubbing [No Edema] : no edema [No Bromhidrosis] : no bromhidrosis [No Chromhidrosis] : no chromhidrosis [FreeTextEntry3] : L axilla- non-active, bridged and atrophic scarring present R axilla- surgical scars, no active HS  L breast- crusted inflamed nodule   On the forehead and cheeks are scattered open and closed comedones with inflammatory pink papules.

## 2024-04-10 NOTE — ASSESSMENT
[FreeTextEntry1] : #HS- Ly stage at least 2 - flaring  Axilla not active today but many bridged scars and sinus tracts Offered ILK, declined. She does not want any injections (humira, cosnetyx) Chronic nature of condition discussed. S/P excision R axilla with plastics -start [id number] BID for 3 months -continue topical clind BID prn -start hibiclens TIW in shower -discussed smoking cessation.  -upcoming WLE with plastics n 5/2024 -RTC 12 months.   #Acne -start tret 0.05% TIW. proper application and SED -doxycycline as above  RTC 12 weeks

## 2024-04-10 NOTE — HISTORY OF PRESENT ILLNESS
[FreeTextEntry1] : RPV- HS, acne [de-identified] : ZAHRA PULIDO 29yo F presents for the above. LV Dr. Winslow 4/2023.   HS- involves axilla + chest. Flare on L breast currently. Using topical clinda, not helping. not using hibiclens. Doxy has helped in the past.  Had excision with plastic surgeon on R axilla, healed well. Has upcoming excision in May for L axilla.  Acne- face, not using anything. Used tazorac in the past, was too irritating.  Smokes 1P every 3 days.

## 2024-04-11 ENCOUNTER — NON-APPOINTMENT (OUTPATIENT)
Age: 29
End: 2024-04-11

## 2024-04-11 DIAGNOSIS — R92.8 OTHER ABNORMAL AND INCONCLUSIVE FINDINGS ON DIAGNOSTIC IMAGING OF BREAST: ICD-10-CM

## 2024-05-14 ENCOUNTER — APPOINTMENT (OUTPATIENT)
Dept: PLASTIC SURGERY | Facility: CLINIC | Age: 29
End: 2024-05-14
Payer: COMMERCIAL

## 2024-05-14 ENCOUNTER — LABORATORY RESULT (OUTPATIENT)
Age: 29
End: 2024-05-14

## 2024-05-14 PROCEDURE — 11404 EXC TR-EXT B9+MARG 3.1-4 CM: CPT

## 2024-05-14 PROCEDURE — 13121 CMPLX RPR S/A/L 2.6-7.5 CM: CPT

## 2024-05-14 RX ORDER — OXYCODONE AND ACETAMINOPHEN 5; 325 MG/1; MG/1
5-325 TABLET ORAL
Qty: 6 | Refills: 0 | Status: ACTIVE | COMMUNITY
Start: 2024-05-14 | End: 1900-01-01

## 2024-05-14 NOTE — PROCEDURE
[FreeTextEntry6] : Preop dx:victor manuel, left arm.  Postopdx: same Procedure: excision 3.1cm mass left arm and complex closure of arm, 3.1cm Anesthesia: local 1% w/ epi Specimens: to path Procedure: 	IC obtained. Lesion demarcated.  Lido 1% w/ epi injected.  15 blade used to incise skin.  Lesion encountered in the subcutaneous  plane and dissected circumferentially. Removed in its entirety, 3.1cm.  Skin edges widely undermined and closed in layers with monocryl for a 3.1cm complex closure. Mastisol and steristrips placed.  No complications.  Specimen sent to path

## 2024-05-21 ENCOUNTER — APPOINTMENT (OUTPATIENT)
Dept: PLASTIC SURGERY | Facility: CLINIC | Age: 29
End: 2024-05-21
Payer: COMMERCIAL

## 2024-05-21 PROCEDURE — 99024 POSTOP FOLLOW-UP VISIT: CPT

## 2024-06-04 ENCOUNTER — APPOINTMENT (OUTPATIENT)
Dept: PLASTIC SURGERY | Facility: CLINIC | Age: 29
End: 2024-06-04

## 2024-06-04 DIAGNOSIS — L73.2 HIDRADENITIS SUPPURATIVA: ICD-10-CM

## 2024-06-04 PROCEDURE — 99212 OFFICE O/P EST SF 10 MIN: CPT

## 2024-06-04 NOTE — HISTORY OF PRESENT ILLNESS
[FreeTextEntry1] : DOP 05/14/24 excision and biopsy HS on left axilla. some drainage. no erythema. no sig pain or swelling

## 2024-07-31 ENCOUNTER — APPOINTMENT (OUTPATIENT)
Dept: DERMATOLOGY | Facility: CLINIC | Age: 29
End: 2024-07-31
Payer: COMMERCIAL

## 2024-07-31 DIAGNOSIS — L70.0 ACNE VULGARIS: ICD-10-CM

## 2024-07-31 PROCEDURE — 99214 OFFICE O/P EST MOD 30 MIN: CPT

## 2024-07-31 RX ORDER — CHLORHEXIDINE GLUCONATE 213 G/1000ML
4 SOLUTION TOPICAL
Qty: 1 | Refills: 11 | Status: ACTIVE | COMMUNITY
Start: 2024-07-31 | End: 1900-01-01

## 2024-07-31 RX ORDER — CLINDAMYCIN PHOSPHATE 10 MG/ML
1 LOTION TOPICAL
Qty: 1 | Refills: 2 | Status: ACTIVE | COMMUNITY
Start: 2024-07-31 | End: 1900-01-01

## 2024-07-31 NOTE — HISTORY OF PRESENT ILLNESS
[FreeTextEntry1] : RPV- HS, acne [de-identified] : ZAHRA PULIDO 27yo F presents for the above. LV with me 4/10/24 HS and acne both improved. Just finished doxy  HS: still getting recurrent cyst on L breast and L axilla. Scheduled for surgical excision of L axilla soon.   Acne: Using tret nightly several times a week. forgets.   ____ 4/10/24 HS- involves axilla + chest. Flare on L breast currently. Using topical clinda, not helping. not using hibiclens. Doxy has helped in the past.  Had excision with plastic surgeon on R axilla, healed well. Has upcoming excision in May for L axilla.  Acne- face, not using anything. Used tazorac in the past, was too irritating.  Smokes 1P every 3 days.

## 2024-07-31 NOTE — PHYSICAL EXAM
[Alert] : alert [Oriented x 3] : ~L oriented x 3 [Well Nourished] : well nourished [Conjunctiva Non-injected] : conjunctiva non-injected [No Visual Lymphadenopathy] : no visual  lymphadenopathy [No Clubbing] : no clubbing [No Edema] : no edema [No Bromhidrosis] : no bromhidrosis [No Chromhidrosis] : no chromhidrosis [FreeTextEntry3] : L axilla- non-active, bridged and atrophic scarring present R axilla- surgical scars, no active HS  L breast- crusted nodule   On the forehead and cheeks are scattered open and closed comedones .. Improved compared to prior photos

## 2024-07-31 NOTE — ASSESSMENT
[FreeTextEntry1] : #HS- Ly stage at least 2 Axilla not active today but many bridged scars and sinus tracts  Chronic nature of condition discussed. S/P excision R axilla with plastics. Upcoming excision L axilla -s/p doxy x 12 weeks.  -continue topical clind lotion BID prn, rx sent -start hibiclens TIW in shower, rx sent  -discussed smoking cessation.  -RTC 12 months.   #Acne -increase tret 0.05% to qHS if tolerated. proper application and SED  RTC 12 weeks

## 2024-08-06 ENCOUNTER — APPOINTMENT (OUTPATIENT)
Dept: PLASTIC SURGERY | Facility: CLINIC | Age: 29
End: 2024-08-06

## 2024-08-06 ENCOUNTER — LABORATORY RESULT (OUTPATIENT)
Age: 29
End: 2024-08-06

## 2024-08-06 PROCEDURE — 11422 EXC H-F-NK-SP B9+MARG 1.1-2: CPT

## 2024-08-06 PROCEDURE — 13120 CMPLX RPR S/A/L 1.1-2.5 CM: CPT

## 2024-08-06 NOTE — HISTORY OF PRESENT ILLNESS
[FreeTextEntry1] : DOP 05/14/24  S/P: excision and biopsy HS on left axilla. Path: Epidermal cyst/squamous sinus tract, with stromal fibrosis some drainage. no erythema. no sig pain or swelling

## 2024-08-06 NOTE — PROCEDURE
[FreeTextEntry6] : Preopdx: left axillary   mass Procedure: excisional biopsy  1.1 cm, and complex closure 1.1 cm left axilla Anesthesia: local 1% w/epi Specimens: to path on formalin No complications   Summary: IC obtained.  Lesion demarcated with marking pen.  1%lido with epinephrine injected.  15 blade used to incise full thickness.    1.1Cm  lesion excised in subcutaneous plane.   Hemostasis obtained with cautery.  Skin edges widely undermined and closed for a complex closure of  1.1 cm.  bacitracin and steristrips placed.

## 2024-08-06 NOTE — REASON FOR VISIT
[Post Op: _________] : a [unfilled] post op visit [FreeTextEntry1] : Excisional biopsy of left axilla lesion.

## 2024-10-07 ENCOUNTER — APPOINTMENT (OUTPATIENT)
Dept: BREAST CENTER | Facility: CLINIC | Age: 29
End: 2024-10-07

## 2024-10-23 ENCOUNTER — APPOINTMENT (OUTPATIENT)
Dept: DERMATOLOGY | Facility: CLINIC | Age: 29
End: 2024-10-23
Payer: COMMERCIAL

## 2024-10-23 VITALS — HEART RATE: 79 BPM | SYSTOLIC BLOOD PRESSURE: 110 MMHG | DIASTOLIC BLOOD PRESSURE: 74 MMHG

## 2024-10-23 VITALS — WEIGHT: 122 LBS | BODY MASS INDEX: 19.69 KG/M2

## 2024-10-23 DIAGNOSIS — L30.9 DERMATITIS, UNSPECIFIED: ICD-10-CM

## 2024-10-23 DIAGNOSIS — L70.0 ACNE VULGARIS: ICD-10-CM

## 2024-10-23 DIAGNOSIS — Z79.899 OTHER LONG TERM (CURRENT) DRUG THERAPY: ICD-10-CM

## 2024-10-23 DIAGNOSIS — L73.2 HIDRADENITIS SUPPURATIVA: ICD-10-CM

## 2024-10-23 PROCEDURE — 99214 OFFICE O/P EST MOD 30 MIN: CPT

## 2024-10-23 RX ORDER — SPIRONOLACTONE 50 MG/1
50 TABLET ORAL
Qty: 1 | Refills: 4 | Status: ACTIVE | COMMUNITY
Start: 2024-10-23 | End: 1900-01-01

## 2024-10-23 RX ORDER — TRIAMCINOLONE ACETONIDE 1 MG/G
0.1 CREAM TOPICAL
Qty: 1 | Refills: 1 | Status: ACTIVE | COMMUNITY
Start: 2024-10-23 | End: 1900-01-01

## 2024-10-28 ENCOUNTER — APPOINTMENT (OUTPATIENT)
Dept: BREAST CENTER | Facility: CLINIC | Age: 29
End: 2024-10-28
Payer: COMMERCIAL

## 2024-10-28 VITALS
SYSTOLIC BLOOD PRESSURE: 115 MMHG | HEIGHT: 66 IN | WEIGHT: 122 LBS | HEART RATE: 73 BPM | DIASTOLIC BLOOD PRESSURE: 74 MMHG | BODY MASS INDEX: 19.61 KG/M2

## 2024-10-28 DIAGNOSIS — R92.8 OTHER ABNORMAL AND INCONCLUSIVE FINDINGS ON DIAGNOSTIC IMAGING OF BREAST: ICD-10-CM

## 2024-10-28 DIAGNOSIS — N61.1 ABSCESS OF THE BREAST AND NIPPLE: ICD-10-CM

## 2024-10-28 PROCEDURE — 99213 OFFICE O/P EST LOW 20 MIN: CPT

## 2025-05-13 ENCOUNTER — RX RENEWAL (OUTPATIENT)
Age: 30
End: 2025-05-13

## 2025-05-16 ENCOUNTER — APPOINTMENT (OUTPATIENT)
Dept: BREAST CENTER | Facility: CLINIC | Age: 30
End: 2025-05-16
Payer: COMMERCIAL

## 2025-05-16 VITALS — BODY MASS INDEX: 29.14 KG/M2 | WEIGHT: 177 LBS | HEIGHT: 65.16 IN

## 2025-05-16 VITALS — WEIGHT: 177 LBS | BODY MASS INDEX: 29.14 KG/M2 | HEIGHT: 65.16 IN

## 2025-05-16 DIAGNOSIS — N61.1 ABSCESS OF THE BREAST AND NIPPLE: ICD-10-CM

## 2025-05-16 DIAGNOSIS — F17.200 NICOTINE DEPENDENCE, UNSPECIFIED, UNCOMPLICATED: ICD-10-CM

## 2025-05-16 DIAGNOSIS — L73.2 HIDRADENITIS SUPPURATIVA: ICD-10-CM

## 2025-05-16 PROCEDURE — 99214 OFFICE O/P EST MOD 30 MIN: CPT

## 2025-05-16 RX ORDER — CALCIUM CARBONATE/VITAMIN D3 600 MG-10
TABLET ORAL
Refills: 0 | Status: ACTIVE | COMMUNITY

## 2025-06-10 ENCOUNTER — NON-APPOINTMENT (OUTPATIENT)
Age: 30
End: 2025-06-10

## 2025-06-10 ENCOUNTER — APPOINTMENT (OUTPATIENT)
Dept: DERMATOLOGY | Facility: CLINIC | Age: 30
End: 2025-06-10
Payer: COMMERCIAL

## 2025-06-10 PROCEDURE — 99214 OFFICE O/P EST MOD 30 MIN: CPT | Mod: 25

## 2025-06-10 PROCEDURE — 11104 PUNCH BX SKIN SINGLE LESION: CPT

## 2025-06-10 RX ORDER — DOXYCYCLINE HYCLATE 100 MG/1
100 CAPSULE ORAL
Qty: 60 | Refills: 2 | Status: ACTIVE | COMMUNITY
Start: 2025-06-10 | End: 1900-01-01

## 2025-06-10 RX ORDER — SPIRONOLACTONE 100 MG/1
100 TABLET ORAL DAILY
Qty: 30 | Refills: 6 | Status: ACTIVE | COMMUNITY
Start: 2025-06-10 | End: 1900-01-01

## 2025-06-10 RX ORDER — CLINDAMYCIN PHOSPHATE 10 MG/ML
1 LOTION TOPICAL
Qty: 1 | Refills: 2 | Status: ACTIVE | COMMUNITY
Start: 2025-06-10 | End: 1900-01-01

## 2025-06-16 ENCOUNTER — NON-APPOINTMENT (OUTPATIENT)
Age: 30
End: 2025-06-16

## 2025-06-17 ENCOUNTER — APPOINTMENT (OUTPATIENT)
Dept: PLASTIC SURGERY | Facility: CLINIC | Age: 30
End: 2025-06-17
Payer: COMMERCIAL

## 2025-06-17 LAB — DERMATOLOGY BIOPSY: NORMAL

## 2025-06-17 PROCEDURE — 99213 OFFICE O/P EST LOW 20 MIN: CPT

## 2025-06-24 ENCOUNTER — APPOINTMENT (OUTPATIENT)
Dept: DERMATOLOGY | Facility: CLINIC | Age: 30
End: 2025-06-24
Payer: COMMERCIAL

## 2025-06-24 PROCEDURE — 99213 OFFICE O/P EST LOW 20 MIN: CPT

## 2025-07-07 ENCOUNTER — NON-APPOINTMENT (OUTPATIENT)
Age: 30
End: 2025-07-07

## 2025-07-08 ENCOUNTER — APPOINTMENT (OUTPATIENT)
Dept: DERMATOLOGY | Facility: CLINIC | Age: 30
End: 2025-07-08
Payer: COMMERCIAL

## 2025-07-08 PROCEDURE — 99214 OFFICE O/P EST MOD 30 MIN: CPT

## 2025-07-08 PROCEDURE — G2211 COMPLEX E/M VISIT ADD ON: CPT | Mod: NC
